# Patient Record
Sex: FEMALE | Race: WHITE | URBAN - NONMETROPOLITAN AREA
[De-identification: names, ages, dates, MRNs, and addresses within clinical notes are randomized per-mention and may not be internally consistent; named-entity substitution may affect disease eponyms.]

---

## 2008-04-25 LAB — TSH SERPL DL<=0.05 MIU/L-ACNC: NORMAL M[IU]/L

## 2019-07-16 ENCOUNTER — OFFICE VISIT (OUTPATIENT)
Dept: FAMILY MEDICINE CLINIC | Age: 49
End: 2019-07-16
Payer: COMMERCIAL

## 2019-07-16 ENCOUNTER — HOSPITAL ENCOUNTER (OUTPATIENT)
Age: 49
Discharge: HOME | End: 2019-07-18
Payer: COMMERCIAL

## 2019-07-16 VITALS
BODY MASS INDEX: 24.59 KG/M2 | WEIGHT: 144 LBS | DIASTOLIC BLOOD PRESSURE: 72 MMHG | HEART RATE: 74 BPM | SYSTOLIC BLOOD PRESSURE: 122 MMHG | TEMPERATURE: 98.7 F | HEIGHT: 64 IN | OXYGEN SATURATION: 98 %

## 2019-07-16 DIAGNOSIS — B96.89 ACUTE BACTERIAL SINUSITIS: ICD-10-CM

## 2019-07-16 DIAGNOSIS — J03.90 EXUDATIVE TONSILLITIS: ICD-10-CM

## 2019-07-16 DIAGNOSIS — J40 BRONCHITIS: ICD-10-CM

## 2019-07-16 DIAGNOSIS — J01.90 ACUTE BACTERIAL SINUSITIS: ICD-10-CM

## 2019-07-16 DIAGNOSIS — J03.90 EXUDATIVE TONSILLITIS: Primary | ICD-10-CM

## 2019-07-16 PROCEDURE — 87081 CULTURE SCREEN ONLY: CPT

## 2019-07-16 PROCEDURE — 99213 OFFICE O/P EST LOW 20 MIN: CPT | Performed by: FAMILY MEDICINE

## 2019-07-16 RX ORDER — MONTELUKAST SODIUM 10 MG/1
TABLET ORAL
Refills: 1 | COMMUNITY
Start: 2019-06-06 | End: 2019-08-28 | Stop reason: SDUPTHER

## 2019-07-16 RX ORDER — FLUTICASONE PROPIONATE 50 MCG
SPRAY, SUSPENSION (ML) NASAL
Refills: 4 | COMMUNITY
Start: 2019-05-27 | End: 2022-10-05 | Stop reason: ALTCHOICE

## 2019-07-16 RX ORDER — AMOXICILLIN 875 MG/1
875 TABLET, COATED ORAL 2 TIMES DAILY
Qty: 20 TABLET | Refills: 0 | Status: SHIPPED | OUTPATIENT
Start: 2019-07-16 | End: 2019-07-26

## 2019-07-16 RX ORDER — METHYLPREDNISOLONE 4 MG/1
TABLET ORAL
Qty: 21 TABLET | Refills: 0 | Status: SHIPPED | OUTPATIENT
Start: 2019-07-16 | End: 2019-07-22

## 2019-07-16 NOTE — PATIENT INSTRUCTIONS
and go to all appointments, and call your doctor if you are having problems. It's also a good idea to know your test results and keep a list of the medicines you take. How can you care for yourself at home? · If your doctor prescribed antibiotics, take them as directed. Do not stop taking them just because you feel better. You need to take the full course of antibiotics. · Gargle with warm salt water. This helps reduce swelling and relieve discomfort. Gargle once an hour with 1 teaspoon of salt mixed in 8 fluid ounces of warm water. · Take an over-the-counter pain medicine, such as acetaminophen (Tylenol), ibuprofen (Advil, Motrin), or naproxen (Aleve). Be safe with medicines. Read and follow all instructions on the label. No one younger than 20 should take aspirin. It has been linked to Reye syndrome, a serious illness. · Be careful when taking over-the-counter cold or flu medicines and Tylenol at the same time. Many of these medicines have acetaminophen, which is Tylenol. Read the labels to make sure that you are not taking more than the recommended dose. Too much acetaminophen (Tylenol) can be harmful. · Try an over-the-counter throat spray to relieve throat pain. · Drink plenty of fluids. Fluids may help soothe an irritated throat. Drink warm or cool liquids (whichever feels better). These include tea, soup, and juice. · Do not smoke, and avoid secondhand smoke. Smoking can make tonsillitis worse. If you need help quitting, talk to your doctor about stop-smoking programs and medicines. These can increase your chances of quitting for good. · Use a vaporizer or humidifier to add moisture to your bedroom. Follow the directions for cleaning the machine. When should you call for help?   Call your doctor now or seek immediate medical care if:    · Your pain gets worse on one side of your throat.     · You have a new or higher fever.     · You notice changes in your voice.     · You have trouble opening your

## 2019-07-16 NOTE — PROGRESS NOTES
Subjective:  Chief Complaint   Patient presents with    Pharyngitis       HPI: The patient states that they have had sinus pressure and congestion for the last 3 weeks. The patient does admit to facial pressure. Admits to cough which is productive of sputum. The patient also reports nasal congestion and sore throat. Denies pain with swallowing/difficulty swallowing. Patient has had yellow rhinorrhea. Mild headache. Denies fevers chills. No dizziness, visual disturbances and or neck stiffness. No chest pain or dyspnea. No vomiting or diarrhea. The patient presents for evaluation. Also admits to hoarseness. H/o allergic rhinitis, takes both singulair and uses fluticasone. ROS:  Const: Positives and pertinent negatives as per HPI. All others reviewed and are negative. Current Outpatient Medications:     amoxicillin (AMOXIL) 875 MG tablet, Take 1 tablet by mouth 2 times daily for 10 days, Disp: 20 tablet, Rfl: 0    methylPREDNISolone (MEDROL DOSEPACK) 4 MG tablet, Take by mouth., Disp: 21 tablet, Rfl: 0    fluticasone (FLONASE) 50 MCG/ACT nasal spray, INSTILL TWO SPRAYS INTO EACH NOSTRIL DAILY, Disp: , Rfl: 4    montelukast (SINGULAIR) 10 MG tablet, TAKE ONE TABLET BY MOUTH EVERY DAY, Disp: , Rfl: 1  No Known Allergies    Objective:  Vitals:    07/16/19 1032   BP: 122/72   Pulse: 74   Temp: 98.7 °F (37.1 °C)   SpO2: 98%   Weight: 144 lb (65.3 kg)   Height: 5' 4\" (1.626 m)       Exam:  Exam:  Const: Appears healthy and well developed. No signs of acute distress present. Vitals reviewed per triage. Head/Face: Normocephalic, atraumatic. Facies is symmetric. Tenderness is noted over the frontal maxillary sinuses. Eyes: PERRL. ENMT: Tympanic membranes are pearly gray with good light reflex bilaterally. Buccal mucosa is moist.  erythema in the posterior pharynx. Purulent PND is noted. Exudative tonsillitis  Neck: Supple and symmetric. Palpation reveals adenopathy. No meningeal signs. Trachea

## 2019-07-19 LAB — S PYO THROAT QL CULT: NORMAL

## 2019-07-30 ENCOUNTER — HOSPITAL ENCOUNTER (OUTPATIENT)
Age: 49
Discharge: HOME | End: 2019-08-01
Payer: COMMERCIAL

## 2019-07-30 ENCOUNTER — OFFICE VISIT (OUTPATIENT)
Dept: FAMILY MEDICINE CLINIC | Age: 49
End: 2019-07-30
Payer: COMMERCIAL

## 2019-07-30 VITALS
OXYGEN SATURATION: 97 % | BODY MASS INDEX: 25.4 KG/M2 | SYSTOLIC BLOOD PRESSURE: 116 MMHG | TEMPERATURE: 97.7 F | HEART RATE: 100 BPM | WEIGHT: 148 LBS | DIASTOLIC BLOOD PRESSURE: 62 MMHG

## 2019-07-30 DIAGNOSIS — R30.0 DYSURIA: Primary | ICD-10-CM

## 2019-07-30 DIAGNOSIS — R10.9 RIGHT FLANK PAIN: ICD-10-CM

## 2019-07-30 LAB
BILIRUBIN, POC: NORMAL
BLOOD URINE, POC: NORMAL
CLARITY, POC: YELLOW
COLOR, POC: NORMAL
GLUCOSE URINE, POC: NORMAL
KETONES, POC: NORMAL
LEUKOCYTE EST, POC: NORMAL
NITRITE, POC: NORMAL
PH, POC: 6.5
PROTEIN, POC: NORMAL
SPECIFIC GRAVITY, POC: 1.01
UROBILINOGEN, POC: 0.2

## 2019-07-30 PROCEDURE — 81002 URINALYSIS NONAUTO W/O SCOPE: CPT | Performed by: FAMILY MEDICINE

## 2019-07-30 PROCEDURE — 87088 URINE BACTERIA CULTURE: CPT

## 2019-07-30 PROCEDURE — 99214 OFFICE O/P EST MOD 30 MIN: CPT | Performed by: FAMILY MEDICINE

## 2019-07-30 RX ORDER — SULFAMETHOXAZOLE AND TRIMETHOPRIM 800; 160 MG/1; MG/1
1 TABLET ORAL 2 TIMES DAILY
Qty: 14 TABLET | Refills: 0 | Status: SHIPPED | OUTPATIENT
Start: 2019-07-30 | End: 2019-08-05

## 2019-08-01 LAB — URINE CULTURE, ROUTINE: NORMAL

## 2019-08-02 ENCOUNTER — TELEPHONE (OUTPATIENT)
Dept: FAMILY MEDICINE CLINIC | Age: 49
End: 2019-08-02

## 2019-08-05 ENCOUNTER — OFFICE VISIT (OUTPATIENT)
Dept: FAMILY MEDICINE CLINIC | Age: 49
End: 2019-08-05
Payer: COMMERCIAL

## 2019-08-05 VITALS
WEIGHT: 145.38 LBS | TEMPERATURE: 98.7 F | BODY MASS INDEX: 26.75 KG/M2 | OXYGEN SATURATION: 99 % | HEIGHT: 62 IN | DIASTOLIC BLOOD PRESSURE: 60 MMHG | HEART RATE: 88 BPM | SYSTOLIC BLOOD PRESSURE: 104 MMHG

## 2019-08-05 DIAGNOSIS — B02.9 HERPES ZOSTER WITHOUT COMPLICATION: Primary | ICD-10-CM

## 2019-08-05 PROCEDURE — 99213 OFFICE O/P EST LOW 20 MIN: CPT | Performed by: FAMILY MEDICINE

## 2019-08-05 RX ORDER — ACYCLOVIR 800 MG/1
800 TABLET ORAL 3 TIMES DAILY
Qty: 30 TABLET | Refills: 0 | Status: SHIPPED | OUTPATIENT
Start: 2019-08-05 | End: 2019-08-15

## 2019-08-05 NOTE — PROGRESS NOTES
8/5/19    Name: Garth Rehman:[id number]   Sex:female   Age:48 y.o. Subjective:  Chief Complaint   Patient presents with    Sinusitis     worried it is not resolved.  Rash     rash above left eyebrow. Patinet presents rash above left eye  Has beenunder a lot of stress lately    Sick for the last few weeks    Under  A lot of stress at work        ROS:  As above, all other pertinent systems negative. Current Outpatient Medications:     acyclovir (ZOVIRAX) 800 MG tablet, Take 1 tablet by mouth 3 times daily for 10 days, Disp: 30 tablet, Rfl: 0    fluticasone (FLONASE) 50 MCG/ACT nasal spray, INSTILL TWO SPRAYS INTO EACH NOSTRIL DAILY, Disp: , Rfl: 4    montelukast (SINGULAIR) 10 MG tablet, TAKE ONE TABLET BY MOUTH EVERY DAY, Disp: , Rfl: 1  No Known Allergies     /60   Pulse 88   Temp 98.7 °F (37.1 °C)   Ht 5' 2\" (1.575 m)   Wt 145 lb 6 oz (65.9 kg)   LMP 07/28/2019   SpO2 99%   BMI 26.59 kg/m²     EXAM:   Physical Exam   Constitutional: She appears well-developed and well-nourished. HENT:   Head: Normocephalic and atraumatic. Eyes: Pupils are equal, round, and reactive to light. EOM are normal.   Neck: Normal range of motion. Cardiovascular: Normal rate and regular rhythm. Pulmonary/Chest: Effort normal and breath sounds normal.   Skin: Skin is warm and dry. Blistery rash above left eyebrow   Nursing note and vitals reviewed. Nuha Prescott was seen today for sinusitis and rash. Diagnoses and all orders for this visit:    Herpes zoster without complication  -     acyclovir (ZOVIRAX) 800 MG tablet; Take 1 tablet by mouth 3 times daily for 10 days    f/u as needed      Seen by:   Kyung James DO

## 2019-08-28 ENCOUNTER — OFFICE VISIT (OUTPATIENT)
Dept: FAMILY MEDICINE CLINIC | Age: 49
End: 2019-08-28
Payer: COMMERCIAL

## 2019-08-28 ENCOUNTER — TELEPHONE (OUTPATIENT)
Dept: FAMILY MEDICINE CLINIC | Age: 49
End: 2019-08-28

## 2019-08-28 ENCOUNTER — HOSPITAL ENCOUNTER (OUTPATIENT)
Age: 49
Discharge: HOME | End: 2019-08-30
Payer: COMMERCIAL

## 2019-08-28 VITALS
DIASTOLIC BLOOD PRESSURE: 82 MMHG | OXYGEN SATURATION: 98 % | HEIGHT: 62 IN | HEART RATE: 80 BPM | SYSTOLIC BLOOD PRESSURE: 118 MMHG | BODY MASS INDEX: 26.31 KG/M2 | WEIGHT: 143 LBS

## 2019-08-28 DIAGNOSIS — Z00.00 ROUTINE GENERAL MEDICAL EXAMINATION AT A HEALTH CARE FACILITY: ICD-10-CM

## 2019-08-28 DIAGNOSIS — N20.0 NEPHROLITHIASIS: ICD-10-CM

## 2019-08-28 DIAGNOSIS — J30.2 SEASONAL ALLERGIES: ICD-10-CM

## 2019-08-28 DIAGNOSIS — Z92.29 HISTORY OF REGULAR MEDICATION USE: ICD-10-CM

## 2019-08-28 DIAGNOSIS — Z00.00 ROUTINE GENERAL MEDICAL EXAMINATION AT A HEALTH CARE FACILITY: Primary | ICD-10-CM

## 2019-08-28 LAB
ALBUMIN SERPL-MCNC: 4.6 G/DL (ref 3.5–5.2)
ALP BLD-CCNC: 56 U/L (ref 35–104)
ALT SERPL-CCNC: 16 U/L (ref 0–32)
ANION GAP SERPL CALCULATED.3IONS-SCNC: 12 MMOL/L (ref 7–16)
AST SERPL-CCNC: 15 U/L (ref 0–31)
BASOPHILS ABSOLUTE: 0.03 E9/L (ref 0–0.2)
BASOPHILS RELATIVE PERCENT: 0.5 % (ref 0–2)
BILIRUB SERPL-MCNC: 0.6 MG/DL (ref 0–1.2)
BUN BLDV-MCNC: 14 MG/DL (ref 6–20)
CALCIUM SERPL-MCNC: 9.2 MG/DL (ref 8.6–10.2)
CHLORIDE BLD-SCNC: 102 MMOL/L (ref 98–107)
CHOLESTEROL, TOTAL: 200 MG/DL (ref 0–199)
CO2: 24 MMOL/L (ref 22–29)
CREAT SERPL-MCNC: 0.6 MG/DL (ref 0.5–1)
EOSINOPHILS ABSOLUTE: 0.17 E9/L (ref 0.05–0.5)
EOSINOPHILS RELATIVE PERCENT: 2.9 % (ref 0–6)
GFR AFRICAN AMERICAN: >60
GFR NON-AFRICAN AMERICAN: >60 ML/MIN/1.73
GLUCOSE BLD-MCNC: 103 MG/DL (ref 74–99)
HCT VFR BLD CALC: 42.7 % (ref 34–48)
HDLC SERPL-MCNC: 62 MG/DL
HEMOGLOBIN: 13.9 G/DL (ref 11.5–15.5)
IMMATURE GRANULOCYTES #: 0.01 E9/L
IMMATURE GRANULOCYTES %: 0.2 % (ref 0–5)
LDL CHOLESTEROL CALCULATED: 113 MG/DL (ref 0–99)
LYMPHOCYTES ABSOLUTE: 1.72 E9/L (ref 1.5–4)
LYMPHOCYTES RELATIVE PERCENT: 29.7 % (ref 20–42)
MCH RBC QN AUTO: 31.4 PG (ref 26–35)
MCHC RBC AUTO-ENTMCNC: 32.6 % (ref 32–34.5)
MCV RBC AUTO: 96.4 FL (ref 80–99.9)
MONOCYTES ABSOLUTE: 0.58 E9/L (ref 0.1–0.95)
MONOCYTES RELATIVE PERCENT: 10 % (ref 2–12)
NEUTROPHILS ABSOLUTE: 3.29 E9/L (ref 1.8–7.3)
NEUTROPHILS RELATIVE PERCENT: 56.7 % (ref 43–80)
PDW BLD-RTO: 12.2 FL (ref 11.5–15)
PLATELET # BLD: 370 E9/L (ref 130–450)
PMV BLD AUTO: 8.8 FL (ref 7–12)
POTASSIUM SERPL-SCNC: 4.2 MMOL/L (ref 3.5–5)
RBC # BLD: 4.43 E12/L (ref 3.5–5.5)
SODIUM BLD-SCNC: 138 MMOL/L (ref 132–146)
TOTAL PROTEIN: 7.3 G/DL (ref 6.4–8.3)
TRIGL SERPL-MCNC: 123 MG/DL (ref 0–149)
VLDLC SERPL CALC-MCNC: 25 MG/DL
WBC # BLD: 5.8 E9/L (ref 4.5–11.5)

## 2019-08-28 PROCEDURE — 85025 COMPLETE CBC W/AUTO DIFF WBC: CPT

## 2019-08-28 PROCEDURE — 80061 LIPID PANEL: CPT

## 2019-08-28 PROCEDURE — 99396 PREV VISIT EST AGE 40-64: CPT | Performed by: INTERNAL MEDICINE

## 2019-08-28 PROCEDURE — 80053 COMPREHEN METABOLIC PANEL: CPT

## 2019-08-28 PROCEDURE — 36415 COLL VENOUS BLD VENIPUNCTURE: CPT

## 2019-08-28 RX ORDER — MONTELUKAST SODIUM 10 MG/1
TABLET ORAL
Qty: 30 TABLET | Refills: 11 | Status: SHIPPED
Start: 2019-08-28 | End: 2020-08-31 | Stop reason: SDUPTHER

## 2019-08-28 ASSESSMENT — PATIENT HEALTH QUESTIONNAIRE - PHQ9
SUM OF ALL RESPONSES TO PHQ QUESTIONS 1-9: 0
1. LITTLE INTEREST OR PLEASURE IN DOING THINGS: 0
2. FEELING DOWN, DEPRESSED OR HOPELESS: 0
SUM OF ALL RESPONSES TO PHQ QUESTIONS 1-9: 0
SUM OF ALL RESPONSES TO PHQ9 QUESTIONS 1 & 2: 0

## 2019-08-28 NOTE — PROGRESS NOTES
Not on file     Non-medical: Not on file   Tobacco Use    Smoking status: Never Smoker    Smokeless tobacco: Never Used   Substance and Sexual Activity    Alcohol use: Not on file    Drug use: Not on file    Sexual activity: Not on file   Lifestyle    Physical activity:     Days per week: Not on file     Minutes per session: Not on file    Stress: Not on file   Relationships    Social connections:     Talks on phone: Not on file     Gets together: Not on file     Attends [employer] service: Not on file     Active member of club or organization: Not on file     Attends meetings of clubs or organizations: Not on file     Relationship status: Not on file    Intimate partner violence:     Fear of current or ex partner: Not on file     Emotionally abused: Not on file     Physically abused: Not on file     Forced sexual activity: Not on file   Other Topics Concern    Not on file   Social History Narrative    Not on file       Vitals:    08/28/19 0657   BP: 118/82   Pulse: 80   SpO2: 98%   Weight: 143 lb (64.9 kg)   Height: 5' 2\" (1.575 m)       Physical Exam  Const: Appears well developed and well nourished. No signs of acute distress present. Eyes: EOMI in both eyes. PERRL. ENMT: . (External Ears) Tympanic membranes are intact. External nose WNL. Moistness and  normal color of the nasal mucosae. Nasal septum. (Septum) . (Teeth) Gums appear healthy. Posterior pharynx shows postnasal drip, but no exudate, irritation or redness. Neck: Supple and symmetric. Palpation reveals no adenopathy. No masses appreciated. Thyroid  exhibits no nodule or thyromegaly. No JVD. Carotids: 2+ and equal bilaterally, without bruits. Resp: No rales, rhonchi or wheezes appreciated over the lungs bilaterally. CV: Rate is regular. Rhythm is regular. S1 is normal. S2 is normal. No gallop or rubs. No heart  murmur appreciated. Extremities: No clubbing, cyanosis or edema. Abdomen: Bowel sounds are normoactive.  Palpation reveals

## 2020-03-09 ENCOUNTER — OFFICE VISIT (OUTPATIENT)
Dept: FAMILY MEDICINE CLINIC | Age: 50
End: 2020-03-09
Payer: COMMERCIAL

## 2020-03-09 ENCOUNTER — HOSPITAL ENCOUNTER (OUTPATIENT)
Age: 50
Discharge: HOME | End: 2020-03-11
Payer: COMMERCIAL

## 2020-03-09 VITALS
BODY MASS INDEX: 26.13 KG/M2 | TEMPERATURE: 98.7 F | OXYGEN SATURATION: 98 % | WEIGHT: 142 LBS | HEIGHT: 62 IN | SYSTOLIC BLOOD PRESSURE: 128 MMHG | DIASTOLIC BLOOD PRESSURE: 62 MMHG | HEART RATE: 107 BPM

## 2020-03-09 LAB
INFLUENZA A ANTIBODY: NEGATIVE
INFLUENZA B ANTIBODY: NEGATIVE
S PYO AG THROAT QL: NORMAL

## 2020-03-09 PROCEDURE — 87804 INFLUENZA ASSAY W/OPTIC: CPT | Performed by: PHYSICIAN ASSISTANT

## 2020-03-09 PROCEDURE — 87070 CULTURE OTHR SPECIMN AEROBIC: CPT

## 2020-03-09 PROCEDURE — 87880 STREP A ASSAY W/OPTIC: CPT | Performed by: PHYSICIAN ASSISTANT

## 2020-03-09 PROCEDURE — 99213 OFFICE O/P EST LOW 20 MIN: CPT | Performed by: PHYSICIAN ASSISTANT

## 2020-03-09 RX ORDER — OSELTAMIVIR PHOSPHATE 75 MG/1
75 CAPSULE ORAL 2 TIMES DAILY
Qty: 10 CAPSULE | Refills: 0 | Status: SHIPPED | OUTPATIENT
Start: 2020-03-09 | End: 2020-03-14

## 2020-03-09 RX ORDER — BROMPHENIRAMINE MALEATE, PSEUDOEPHEDRINE HYDROCHLORIDE, AND DEXTROMETHORPHAN HYDROBROMIDE 2; 30; 10 MG/5ML; MG/5ML; MG/5ML
5 SYRUP ORAL 4 TIMES DAILY PRN
Qty: 120 ML | Refills: 0 | Status: SHIPPED | OUTPATIENT
Start: 2020-03-09 | End: 2020-03-16

## 2020-03-09 NOTE — PROGRESS NOTES
3/9/2020   [address] 35  [facility]  : 1970  Age: 54 y.o. Sex: female      Subjective:  Chief Complaint   Patient presents with    Cough    Fever    Generalized Body Aches    Pharyngitis       HPI: The patient states cough sore throat, subjective fever, chills, myalgias and nasal and chest congestion that started abruptly yesterday. Cough is productive of discolored sputum. Pt has been taking OTC medications without significant improvement. Denies chest pain or shortness of breath. No vomiting or diarrhea. Eating and drinking without difficulty. Patient states she recently went to [facility] last week, returning a week ago yesterday. She denies smoking and any medical problems. The patient denies any other symptoms and presents for evaluation. ROS:    Constitutional: Positive fever negative for fatigue and unexpected weight change. HENT: + for sore throat, sinus pressure, drainage, congestion. Negative for  ear discharge, ear pain, hearing loss, mouth sores and sneezing. Eyes: Negative for photophobia, pain, redness and itching. Respiratory: +cough,  Negative for chest tightness, shortness of breath and wheezing. Cardiovascular: Negative for chest pain, palpitations and leg swelling. Gastrointestinal:  Negative for abdominal pain, constipation, diarrhea, nausea and vomiting. Endocrine: Negative for cold intolerance and heat intolerance. Genitourinary: Negative for difficulty urinating, dysuria, frequency, hematuria and urgency. Musculoskeletal: Positive myalgias. Negative for arthralgias, back pain, joint swelling, neck pain and neck stiffness. Skin: Negative for color change, pallor and wound. Allergic/Immunologic: Negative for environmental allergies and food allergies. Neurological: Negative for dizziness, seizures, syncope, weakness, light-headedness and headaches.    Hematological: Negative for adenopathy. Current Outpatient Medications:     brompheniramine-pseudoephedrine-DM 2-30-10 MG/5ML syrup, Take 5 mLs by mouth 4 times daily as needed for Congestion or Cough, Disp: 120 mL, Rfl: 0    oseltamivir (TAMIFLU) 75 MG capsule, Take 1 capsule by mouth 2 times daily for 5 days, Disp: 10 capsule, Rfl: 0    montelukast (SINGULAIR) 10 MG tablet, TAKE ONE TABLET BY MOUTH EVERY DAY, Disp: 30 tablet, Rfl: 11    fluticasone (FLONASE) 50 MCG/ACT nasal spray, INSTILL TWO SPRAYS INTO EACH NOSTRIL DAILY, Disp: , Rfl: 4   No Known Allergies     Objective:  Vitals:    03/09/20 0826   BP: 128/62   Pulse: 107   Temp: 98.7 °F (37.1 °C)   SpO2: 98%   Weight: 142 lb (64.4 kg)   Height: 5' 2\" (1.575 m)        Exam:  Const: Appears healthy and well developed. No signs of acute distress present. Vitals reviewed per triage. Head/Face: Normocephalic, atraumatic. Facies is symmetric. Eyes: PERRL. ENMT: Tympanic membranes are pearly gray with good light reflex bilaterally. Nares are patent. Buccal mucosa is moist.  No erythema in the posterior pharynx. Neck: Supple and symmetric. Palpation reveals no adenopathy. Trachea midline. Resp: Lungs are clear bilaterally. No adventitious sounds audible. CV: S1 is normal. S2 is normal.Pulses are equal bilaterally. Musculo: Patient moves extremities without pain or limitation. No pedal edema. Skin: Skin is warm and dry. Neuro: Alert and oriented x3. Speech is articulate and fluent. Psych: Patient's mood and affect is appropriate to situation. Jesus was seen today for cough, fever, generalized body aches and pharyngitis. Diagnoses and all orders for this visit:    Cough  -     POCT Influenza A/B    Sore throat  -     POCT rapid strep A  -     THROAT CULTURE; Future    Flu-like symptoms  -     brompheniramine-pseudoephedrine-DM 2-30-10 MG/5ML syrup;  Take 5 mLs by mouth 4 times daily as needed for Congestion or Cough  -     oseltamivir (TAMIFLU) 75 MG

## 2020-03-11 LAB — THROAT CULTURE: NORMAL

## 2020-06-17 ENCOUNTER — TELEPHONE (OUTPATIENT)
Dept: FAMILY MEDICINE CLINIC | Age: 50
End: 2020-06-17

## 2020-06-17 ENCOUNTER — VIRTUAL VISIT (OUTPATIENT)
Dept: FAMILY MEDICINE CLINIC | Age: 50
End: 2020-06-17
Payer: COMMERCIAL

## 2020-06-17 PROCEDURE — 99213 OFFICE O/P EST LOW 20 MIN: CPT | Performed by: INTERNAL MEDICINE

## 2020-06-17 RX ORDER — AMOXICILLIN AND CLAVULANATE POTASSIUM 875; 125 MG/1; MG/1
1 TABLET, FILM COATED ORAL 2 TIMES DAILY
Qty: 20 TABLET | Refills: 0 | Status: SHIPPED | OUTPATIENT
Start: 2020-06-17 | End: 2020-06-27

## 2020-06-17 RX ORDER — PREDNISONE 10 MG/1
TABLET ORAL
Qty: 12 TABLET | Refills: 0 | Status: SHIPPED | OUTPATIENT
Start: 2020-06-17 | End: 2020-06-23

## 2020-06-17 ASSESSMENT — PATIENT HEALTH QUESTIONNAIRE - PHQ9
SUM OF ALL RESPONSES TO PHQ QUESTIONS 1-9: 0
2. FEELING DOWN, DEPRESSED OR HOPELESS: 0
SUM OF ALL RESPONSES TO PHQ9 QUESTIONS 1 & 2: 0
SUM OF ALL RESPONSES TO PHQ QUESTIONS 1-9: 0
1. LITTLE INTEREST OR PLEASURE IN DOING THINGS: 0

## 2020-06-17 ASSESSMENT — ENCOUNTER SYMPTOMS
SORE THROAT: 0
CHEST TIGHTNESS: 0
EYES NEGATIVE: 1
NAUSEA: 1
SINUS PRESSURE: 1
SINUS PAIN: 1
WHEEZING: 0
COUGH: 0
SHORTNESS OF BREATH: 0

## 2020-08-31 ENCOUNTER — TELEPHONE (OUTPATIENT)
Dept: FAMILY MEDICINE CLINIC | Age: 50
End: 2020-08-31

## 2020-08-31 ENCOUNTER — HOSPITAL ENCOUNTER (OUTPATIENT)
Age: 50
Discharge: HOME | End: 2020-09-02
Payer: COMMERCIAL

## 2020-08-31 ENCOUNTER — OFFICE VISIT (OUTPATIENT)
Dept: FAMILY MEDICINE CLINIC | Age: 50
End: 2020-08-31
Payer: COMMERCIAL

## 2020-08-31 VITALS
HEART RATE: 74 BPM | DIASTOLIC BLOOD PRESSURE: 68 MMHG | TEMPERATURE: 97.6 F | OXYGEN SATURATION: 98 % | HEIGHT: 62 IN | SYSTOLIC BLOOD PRESSURE: 116 MMHG | WEIGHT: 144.6 LBS | BODY MASS INDEX: 26.61 KG/M2

## 2020-08-31 LAB
ALBUMIN SERPL-MCNC: 4.2 G/DL (ref 3.5–5.2)
ALP BLD-CCNC: 57 U/L (ref 35–104)
ALT SERPL-CCNC: 15 U/L (ref 0–32)
ANION GAP SERPL CALCULATED.3IONS-SCNC: 13 MMOL/L (ref 7–16)
AST SERPL-CCNC: 19 U/L (ref 0–31)
BASOPHILS ABSOLUTE: 0.03 E9/L (ref 0–0.2)
BASOPHILS RELATIVE PERCENT: 0.5 % (ref 0–2)
BILIRUB SERPL-MCNC: 0.8 MG/DL (ref 0–1.2)
BUN BLDV-MCNC: 13 MG/DL (ref 6–20)
CALCIUM SERPL-MCNC: 9.2 MG/DL (ref 8.6–10.2)
CHLORIDE BLD-SCNC: 100 MMOL/L (ref 98–107)
CO2: 24 MMOL/L (ref 22–29)
CREAT SERPL-MCNC: 0.6 MG/DL (ref 0.5–1)
EOSINOPHILS ABSOLUTE: 0.2 E9/L (ref 0.05–0.5)
EOSINOPHILS RELATIVE PERCENT: 3.4 % (ref 0–6)
GFR AFRICAN AMERICAN: >60
GFR NON-AFRICAN AMERICAN: >60 ML/MIN/1.73
GLUCOSE BLD-MCNC: 86 MG/DL (ref 74–99)
HCT VFR BLD CALC: 41.2 % (ref 34–48)
HEMOGLOBIN: 13.4 G/DL (ref 11.5–15.5)
IMMATURE GRANULOCYTES #: 0.01 E9/L
IMMATURE GRANULOCYTES %: 0.2 % (ref 0–5)
LYMPHOCYTES ABSOLUTE: 1.82 E9/L (ref 1.5–4)
LYMPHOCYTES RELATIVE PERCENT: 30.7 % (ref 20–42)
MCH RBC QN AUTO: 31.6 PG (ref 26–35)
MCHC RBC AUTO-ENTMCNC: 32.5 % (ref 32–34.5)
MCV RBC AUTO: 97.2 FL (ref 80–99.9)
MONOCYTES ABSOLUTE: 0.63 E9/L (ref 0.1–0.95)
MONOCYTES RELATIVE PERCENT: 10.6 % (ref 2–12)
NEUTROPHILS ABSOLUTE: 3.24 E9/L (ref 1.8–7.3)
NEUTROPHILS RELATIVE PERCENT: 54.6 % (ref 43–80)
PDW BLD-RTO: 12.3 FL (ref 11.5–15)
PLATELET # BLD: 355 E9/L (ref 130–450)
PMV BLD AUTO: 9.2 FL (ref 7–12)
POTASSIUM SERPL-SCNC: 4.1 MMOL/L (ref 3.5–5)
RBC # BLD: 4.24 E12/L (ref 3.5–5.5)
SODIUM BLD-SCNC: 137 MMOL/L (ref 132–146)
TOTAL PROTEIN: 6.8 G/DL (ref 6.4–8.3)
WBC # BLD: 5.9 E9/L (ref 4.5–11.5)

## 2020-08-31 PROCEDURE — 99396 PREV VISIT EST AGE 40-64: CPT | Performed by: INTERNAL MEDICINE

## 2020-08-31 PROCEDURE — 85025 COMPLETE CBC W/AUTO DIFF WBC: CPT

## 2020-08-31 PROCEDURE — 36415 COLL VENOUS BLD VENIPUNCTURE: CPT

## 2020-08-31 PROCEDURE — 80053 COMPREHEN METABOLIC PANEL: CPT

## 2020-08-31 RX ORDER — MONTELUKAST SODIUM 10 MG/1
TABLET ORAL
Qty: 30 TABLET | Refills: 11 | Status: SHIPPED
Start: 2020-08-31 | End: 2021-09-01 | Stop reason: SDUPTHER

## 2020-08-31 RX ORDER — FEXOFENADINE HCL 180 MG/1
180 TABLET ORAL DAILY
COMMUNITY
End: 2021-09-01 | Stop reason: ALTCHOICE

## 2020-08-31 RX ORDER — FAMOTIDINE 20 MG/1
TABLET, FILM COATED ORAL
COMMUNITY
Start: 2020-08-25

## 2020-08-31 RX ORDER — AZELASTINE HCL 205.5 UG/1
SPRAY NASAL
COMMUNITY
Start: 2020-08-25 | End: 2021-09-01

## 2020-08-31 NOTE — PROGRESS NOTES
[facility]     [address]  Christian Burroughs : 1970 Sex: female  Age: 54 y.o. Chief Complaint   Patient presents with    Annual Exam       HPI  Patient presents today for annual physical/preventative visit. In general she is been doing well. She has seen allergy and immunology and there was discussion regarding immunotherapy and getting allergy shots which she is contemplating. They did start her on azelastine in addition to her Flonase and also added Pepcid  There was discussion about seeing ear nose and throat but she is not set up with them at this time and wants to wait and see how she does  With shots first.    Review of Systems   Const: Denies chills, fever and sweats. Eyes: Denies eye symptoms. ENMT: Denies ear symptoms. Reports postnasal drip, but denies other nasal symptoms. Denies  mouth or throat symptoms. Admits to frequent sinus infections and allergy type symptoms. CV: Denies chest pain, orthopnea and palpitations. Resp: Denies cough, SOB and wheezing. GI: Denies diarrhea, nausea and vomiting. : Urinary-see above  Musculo: Occasional arthralgias  Skin: Denies eczema, pruritus and sores. Neuro: Denies dizziness, headache, seizures and syncope. Positive for occasional tingling to feet. -Declined any further evaluation at this time  Psych: Denies psychiatric symptoms. Significant allergies         REST OF PERTINENT ROS GONE OVER AND WAS NEGATIVE.          PMH:  Problem List: Spasm, Low back pain, Headache, Neck pain  Health Maintenance:  Influenza Vaccination - (10/5/2018)  Colonoscopy - () , -due 22  Pelvic/Pap Exam - (3/19/0) GYN-  EKG -   Medical Problems:  Herpes Zoster, Gestational Diabetes. , Diverticulitis, IBS  Herpes Simplex - Recurrent left forehead  Seasonal Allergies  Surgical Hx:  Tubal Ligation - (2003)  Laparoscopic colon resection for diverticulitis  Reviewed, no changes.   FH:  She has a half sister with some kind of benign pancreatic tumor . ovarian cyst  Also, diabetes runs on the father's side. Nothing else in the family history she is aware of. .  Father:  Renal cell cancer. Mother:  Insulin Dependent Diabetes, Hypertension, Arrhythmia. Sergey Angulo  1970 Page #2  Sister 1:  No Current Problems - twin sister. Maternal Grandmother:  Bladder Cancer.,  Breast cancer  Maternal Grandfather:  Heart Disease - . Paternal Aunt 1:  Crohn's Disease. Reviewed, no changes. SH:  Marital: . perishable Director, [employer] [place]  Personal Habits: Cigarette Use: Negative For current cigarette smoker. Alcohol: Occasionally  consumes alcohol. Reviewed, no changes.             Current Outpatient Medications:     azelastine HCl 0.15 % SOLN, USE ONE SPRAY INTO NOSTRIL(S) TWICE A DAY OR TWO SPRAYS ONCE A DAY., Disp: , Rfl:     famotidine (PEPCID) 20 MG tablet, , Disp: , Rfl:     montelukast (SINGULAIR) 10 MG tablet, TAKE ONE TABLET BY MOUTH EVERY DAY, Disp: 30 tablet, Rfl: 11    Multiple Vitamin (MULTI-VITAMIN DAILY PO), Take by mouth, Disp: , Rfl:     Ascorbic Acid (VITAMIN C PO), Take by mouth, Disp: , Rfl:     VITAMIN D PO, Take by mouth, Disp: , Rfl:     fexofenadine (ALLEGRA ALLERGY) 180 MG tablet, Take 180 mg by mouth daily, Disp: , Rfl:     fluticasone (FLONASE) 50 MCG/ACT nasal spray, INSTILL TWO SPRAYS INTO EACH NOSTRIL DAILY, Disp: , Rfl: 4  No Known Allergies    No past medical history on file. No past surgical history on file. No family history on file.   Social History     Socioeconomic History    Marital status:      Spouse name: Not on file    Number of children: Not on file    Years of education: Not on file    Highest education level: Not on file   Occupational History    Not on file   Social Needs    Financial resource strain: Not on file    Food insecurity     Worry: Not on file     Inability: Not on file    Transportation needs     Medical: Not on file     Non-medical: Not on file reveals softness, with no distension,  organomegaly or tenderness. No abdominal masses palpable. No palpable hepatosplenomegaly. Musculo: Walks with a normal gait. Upper Extremities: Full ROM bilaterally. Const: Appears well developed and well nourished. No signs of acute distress present. Eyes: EOMI in both eyes. PERRL. ENMT: . (External Ears) Tympanic membranes are intact. External nose WNL. Moistness and  normal color of the nasal mucosae. Nasal septum. (Septum) . (Teeth) Gums appear healthy. Posterior pharynx shows postnasal drip, but no exudate, irritation or redness. Neck: Supple and symmetric. Palpation reveals no adenopathy. No masses appreciated. Thyroid  exhibits no nodule or thyromegaly. No JVD. Carotids: 2+ and equal bilaterally, without bruits. Resp: No rales, rhonchi or wheezes appreciated over the lungs bilaterally. CV: Rate is regular. Rhythm is regular. S1 is normal. S2 is normal. No gallop or rubs. No heart  murmur appreciated. Extremities: No clubbing, cyanosis or edema. Abdomen: Bowel sounds are normoactive. Palpation reveals softness, with no distension,  organomegaly or tenderness. No abdominal masses palpable. No palpable hepatosplenomegaly. Musculo: Walks with a normal gait. Upper Extremities: Full ROM bilaterally. She does have reproducible tenderness to palpation lateral epicondyle region right elbow. Lower Extremities:  Full ROM bilaterally. Skin: Dry and warm with no rash /darkened nevus below umbilicus, which she states she's had all of her  life and is unchanged. Neuro: Alert and oriented x3. Mood is normal. Affect is normal. Speech is articulate and fluent. Upper Extremities: motor strength is intact. Normal muscle tone bilaterally. Lower Extremities: motor  strength is intact. Normal muscle tone bilaterally. Sensation intact to light touch. Reflexes: DTR's are  symmetric, intact and 2+ bilaterally. Cranial Nerves: Cranial nerves II-XII intact.   Psych: Patient's attitude is cooperative. Patient's affect is appropriate. Judgement is realistic. Insight      Assessment and Plan:  Radha Putnam was seen today for annual exam.    Diagnoses and all orders for this visit:    Routine general medical examination at a health care facility  -     CBC Auto Differential; Future  -     Comprehensive Metabolic Panel; Future    Seasonal allergies    History of regular medication use  -     CBC Auto Differential; Future  -     Comprehensive Metabolic Panel; Future    Other orders  -     montelukast (SINGULAIR) 10 MG tablet; TAKE ONE TABLET BY MOUTH EVERY DAY    Plan: See above body report. Continue to follow with allergy and immunology. Continue to follow with GYN. Blood work today. See back in 1 year and as needed. Prescription management performed. Notify us with problems in the interim. Return in about 6 months (around 2/28/2021). Seen By:  Prisca Larsen MD      *Document was created using voice recognition software. Note was reviewed however may contain grammatical errors.

## 2020-12-30 ENCOUNTER — OFFICE VISIT (OUTPATIENT)
Dept: FAMILY MEDICINE CLINIC | Age: 50
End: 2020-12-30
Payer: COMMERCIAL

## 2020-12-30 VITALS
BODY MASS INDEX: 26.87 KG/M2 | HEART RATE: 104 BPM | WEIGHT: 146 LBS | DIASTOLIC BLOOD PRESSURE: 76 MMHG | SYSTOLIC BLOOD PRESSURE: 140 MMHG | TEMPERATURE: 98.6 F | HEIGHT: 62 IN | OXYGEN SATURATION: 98 %

## 2020-12-30 DIAGNOSIS — K57.92 DIVERTICULITIS: ICD-10-CM

## 2020-12-30 DIAGNOSIS — R11.0 NAUSEA: ICD-10-CM

## 2020-12-30 DIAGNOSIS — R10.9 ABDOMINAL PAIN, UNSPECIFIED ABDOMINAL LOCATION: ICD-10-CM

## 2020-12-30 DIAGNOSIS — R35.0 FREQUENCY OF MICTURITION: ICD-10-CM

## 2020-12-30 LAB
BILIRUBIN, POC: NEGATIVE
BLOOD URINE, POC: NEGATIVE
CLARITY, POC: CLEAR
COLOR, POC: NORMAL
GLUCOSE URINE, POC: NEGATIVE
KETONES, POC: NEGATIVE
LEUKOCYTE EST, POC: NEGATIVE
NITRITE, POC: NEGATIVE
PH, POC: 6
PROTEIN, POC: NEGATIVE
SPECIFIC GRAVITY, POC: 1.01
UROBILINOGEN, POC: 0.2

## 2020-12-30 PROCEDURE — 99214 OFFICE O/P EST MOD 30 MIN: CPT | Performed by: INTERNAL MEDICINE

## 2020-12-30 PROCEDURE — 81002 URINALYSIS NONAUTO W/O SCOPE: CPT | Performed by: INTERNAL MEDICINE

## 2020-12-30 RX ORDER — AMOXICILLIN AND CLAVULANATE POTASSIUM 875; 125 MG/1; MG/1
1 TABLET, FILM COATED ORAL 2 TIMES DAILY
Qty: 20 TABLET | Refills: 0 | Status: SHIPPED
Start: 2020-12-30 | End: 2020-12-30

## 2020-12-30 RX ORDER — AMOXICILLIN AND CLAVULANATE POTASSIUM 875; 125 MG/1; MG/1
1 TABLET, FILM COATED ORAL 2 TIMES DAILY
Qty: 20 TABLET | Refills: 0 | Status: SHIPPED | OUTPATIENT
Start: 2020-12-30 | End: 2021-01-09

## 2021-01-01 NOTE — PROGRESS NOTES
Neck pain  Health Maintenance:  Influenza Vaccination - (10/5/2018)  Colonoscopy - () , -due 22  Pelvic/Pap Exam - (3/19/0) GYN-  EKG -   Medical Problems:  Herpes Zoster, Gestational Diabetes. , Diverticulitis, IBS  Herpes Simplex - Recurrent left forehead  Seasonal Allergies  Kidney stones  Surgical Hx:  Tubal Ligation - (2003)  Laparoscopic colon resection for diverticulitis  Reviewed, no changes. FH:  She has a half sister with some kind of benign pancreatic tumor . ovarian cyst  Also, diabetes runs on the father's side. Nothing else in the family history she is aware of. .  Father:  Renal cell cancer. Mother:  Insulin Dependent Diabetes, Hypertension, Arrhythmia. Niranjan Mukherjee  1970 Page #2  Sister 1:  No Current Problems - twin sister. Maternal Grandmother:  Bladder Cancer. ,  Breast cancer  Maternal Grandfather:  Heart Disease - . Paternal Aunt 1:  Crohn's Disease. Reviewed, no changes. SH:  Marital: . perishable Director, sg joiner  Personal Habits: Cigarette Use: Negative For current cigarette smoker. Alcohol: Occasionally  consumes alcohol.   Reviewed, no changes.                Current Outpatient Medications:     amoxicillin-clavulanate (AUGMENTIN) 875-125 MG per tablet, Take 1 tablet by mouth 2 times daily for 10 days, Disp: 20 tablet, Rfl: 0    azelastine HCl 0.15 % SOLN, USE ONE SPRAY INTO NOSTRIL(S) TWICE A DAY OR TWO SPRAYS ONCE A DAY., Disp: , Rfl:     famotidine (PEPCID) 20 MG tablet, , Disp: , Rfl:     montelukast (SINGULAIR) 10 MG tablet, TAKE ONE TABLET BY MOUTH EVERY DAY, Disp: 30 tablet, Rfl: 11    Multiple Vitamin (MULTI-VITAMIN DAILY PO), Take by mouth, Disp: , Rfl:     Ascorbic Acid (VITAMIN C PO), Take by mouth, Disp: , Rfl:     VITAMIN D PO, Take by mouth, Disp: , Rfl:     fexofenadine (ALLEGRA ALLERGY) 180 MG tablet, Take 180 mg by mouth daily, Disp: , Rfl:     fluticasone (FLONASE) 50 MCG/ACT nasal spray, INSTILL TWO SPRAYS INTO EACH NOSTRIL DAILY, Disp: , Rfl: 4  No Known Allergies    No past medical history on file. No past surgical history on file. No family history on file. Social History     Socioeconomic History    Marital status:      Spouse name: Not on file    Number of children: Not on file    Years of education: Not on file    Highest education level: Not on file   Occupational History    Not on file   Social Needs    Financial resource strain: Not on file    Food insecurity     Worry: Not on file     Inability: Not on file    Transportation needs     Medical: Not on file     Non-medical: Not on file   Tobacco Use    Smoking status: Never Smoker    Smokeless tobacco: Never Used   Substance and Sexual Activity    Alcohol use: Not on file    Drug use: Not on file    Sexual activity: Not on file   Lifestyle    Physical activity     Days per week: Not on file     Minutes per session: Not on file    Stress: Not on file   Relationships    Social connections     Talks on phone: Not on file     Gets together: Not on file     Attends Gnosticism service: Not on file     Active member of club or organization: Not on file     Attends meetings of clubs or organizations: Not on file     Relationship status: Not on file    Intimate partner violence     Fear of current or ex partner: Not on file     Emotionally abused: Not on file     Physically abused: Not on file     Forced sexual activity: Not on file   Other Topics Concern    Not on file   Social History Narrative    Not on file       Vitals:    12/30/20 1259   BP: (!) 140/76   Pulse: 104   Temp: 98.6 °F (37 °C)   TempSrc: Temporal   SpO2: 98%   Weight: 146 lb (66.2 kg)   Height: 5' 2\" (1.575 m)       Physical Exam    Const: Appears well developed and well nourished. No signs of acute distress present. Eyes: EOMI in both eyes. PERRL. ENMT: . (External Ears) Tympanic membranes are intact. External nose WNL.  Moistness and  normal color of the nasal mucosae. Nasal septum. (Septum) . (Teeth) Gums appear healthy. Posterior pharynx shows postnasal drip, but no exudate, irritation or redness. Neck: Supple and symmetric. Palpation reveals no adenopathy. No masses appreciated. Thyroid  exhibits no nodule or thyromegaly. No JVD. Carotids: 2+ and equal bilaterally, without bruits. Resp: No rales, rhonchi or wheezes appreciated over the lungs bilaterally. CV: Rate is regular. Rhythm is regular. S1 is normal. S2 is normal. No gallop or rubs. No heart  murmur appreciated. Extremities: No clubbing, cyanosis or edema. Abdomen: Bowel sounds are normoactive. Palpation reveals softness, with no distension,  organomegaly or tenderness. No abdominal masses palpable. No palpable hepatosplenomegaly. Musculo: Walks with a normal gait. Upper Extremities: Full ROM bilaterally.  Const: Appears well developed and well nourished. No signs of acute distress present. Eyes: EOMI in both eyes. PERRL. ENMT: . (External Ears) Tympanic membranes are intact. External nose WNL. Moistness and  normal color of the nasal mucosae. Nasal septum. (Septum) . (Teeth) Gums appear healthy. Posterior pharynx shows postnasal drip, but no exudate, irritation or redness. Neck: Supple and symmetric. Palpation reveals no adenopathy. No masses appreciated. Thyroid  exhibits no nodule or thyromegaly. No JVD. Carotids: 2+ and equal bilaterally, without bruits. Resp: No rales, rhonchi or wheezes appreciated over the lungs bilaterally. CV: Rate is regular. Rhythm is regular. S1 is normal. S2 is normal. No gallop or rubs. No heart  murmur appreciated. Extremities: No clubbing, cyanosis or edema. Abdomen: Bowel sounds are normoactive. Palpation reveals softness, with no distension,  organomegaly or tenderness. No abdominal masses palpable. No palpable hepatosplenomegaly. Musculo: Walks with a normal gait.  Upper Extremities: Full ROM bilaterally.  She does have reproducible tenderness to palpation lateral epicondyle region right elbow.  Lower Extremities:  Full ROM bilaterally. Skin: Dry and warm with no rash /darkened nevus below umbilicus, which she states she's had all of her  life and is unchanged. Neuro: Alert and oriented x3. Mood is normal. Affect is normal. Speech is articulate and fluent. Upper Extremities: motor strength is intact. Normal muscle tone bilaterally. Lower Extremities: motor  strength is intact. Normal muscle tone bilaterally. Sensation intact to light touch. Reflexes: DTR's are  symmetric, intact and 2+ bilaterally. Cranial Nerves: Cranial nerves II-XII intact. Psych: Patient's attitude is cooperative. Patient's affect is appropriate. Judgement is realistic. Insight           Assessment and Plan:  Naga Winn was seen today for urinary tract infection and abdominal pain. Diagnoses and all orders for this visit:    Abdominal pain, unspecified abdominal location  -     CBC Auto Differential; Future  -     Comprehensive Metabolic Panel; Future  -     AMYLASE; Future    Diverticulitis    Nausea    Frequency of micturition  -     POCT Urinalysis no Micro    Other orders  -     Discontinue: amoxicillin-clavulanate (AUGMENTIN) 875-125 MG per tablet; Take 1 tablet by mouth 2 times daily for 10 days  -     amoxicillin-clavulanate (AUGMENTIN) 875-125 MG per tablet; Take 1 tablet by mouth 2 times daily for 10 days    Plan: I told her at this time clinically this looks like diverticulitis however cannot be absolutely sure without scanning her. After full discussion it was elected to hold off on the scan and trial antibiotics and observation over the next couple days. Recommended a soft diet and plenty fluids. Blood work to monitor disease progression and medication use. Augmentin. Warned of potential side effects. Probiotic. Notify us if not improving or any worsening ensues. She is to go to the emergency room immediately if any worsening. I will see her back in 2 weeks to reassess.   Notify me with problems in the interim. Return in about 2 weeks (around 1/13/2021). Seen By:  Albaro Rios MD      *Document was created using voice recognition software. Note was reviewed however may contain grammatical errors.

## 2021-01-14 ENCOUNTER — OFFICE VISIT (OUTPATIENT)
Dept: FAMILY MEDICINE CLINIC | Age: 51
End: 2021-01-14
Payer: COMMERCIAL

## 2021-01-14 VITALS
HEIGHT: 62 IN | OXYGEN SATURATION: 98 % | HEART RATE: 91 BPM | TEMPERATURE: 98.2 F | WEIGHT: 146 LBS | SYSTOLIC BLOOD PRESSURE: 112 MMHG | DIASTOLIC BLOOD PRESSURE: 74 MMHG | BODY MASS INDEX: 26.87 KG/M2

## 2021-01-14 DIAGNOSIS — M79.605 LEFT LEG PAIN: ICD-10-CM

## 2021-01-14 DIAGNOSIS — M70.62 GREATER TROCHANTERIC BURSITIS OF LEFT HIP: ICD-10-CM

## 2021-01-14 DIAGNOSIS — R10.9 ABDOMINAL PAIN, UNSPECIFIED ABDOMINAL LOCATION: Primary | ICD-10-CM

## 2021-01-14 PROCEDURE — 99213 OFFICE O/P EST LOW 20 MIN: CPT | Performed by: INTERNAL MEDICINE

## 2021-01-14 RX ORDER — NAPROXEN 500 MG/1
500 TABLET ORAL 2 TIMES DAILY WITH MEALS
Qty: 20 TABLET | Refills: 0 | Status: SHIPPED
Start: 2021-01-14 | End: 2021-09-01 | Stop reason: ALTCHOICE

## 2021-01-14 ASSESSMENT — ENCOUNTER SYMPTOMS
VOMITING: 0
ABDOMINAL PAIN: 1
NAUSEA: 0
DIARRHEA: 0
BLOOD IN STOOL: 0
BACK PAIN: 1
CONSTIPATION: 0

## 2021-01-14 NOTE — PROGRESS NOTES
[facility]     21  Mo Book : 1970 Sex: female  Age: 52 y.o. Chief Complaint   Patient presents with    2 Week Follow-Up     finished the antbiotic last tuesday and was still having some pain; she states the pain was coming up on the right side       HPI    Patient presents today for 2-week follow-up visit on her problems. When saw her last we did diagnose diverticulitis and treated with Augmentin. Left lower quadrant pain did resolve. She states that she has had some other issues recently where she had pain on the right side in the upper quadrant. She cannot correlate it with food or activity. There is no nausea vomiting or diarrhea with this. Was intermittent and short-lived. She is having no issues today. Denies any fever or chills. Also complaining of some left leg discomfort. Primarily lateral aspect no history of injury. Can extend from the buttock area down to proximal to the knee. She states she does have a TENS unit she states she has been using with some success. Review of Systems   Constitutional: Negative for chills and fever. Cardiovascular: Negative for chest pain. Gastrointestinal: Positive for abdominal pain. Negative for blood in stool, constipation, diarrhea, nausea and vomiting. See above   Genitourinary: Negative for difficulty urinating, dysuria, flank pain, hematuria and pelvic pain. Musculoskeletal: Positive for back pain. Low back. Skin: Negative for rash. Neurological: Negative for weakness and numbness. REST OF PERTINENT ROS GONE OVER AND WAS NEGATIVE.                Current Outpatient Medications:     azelastine HCl 0.15 % SOLN, USE ONE SPRAY INTO NOSTRIL(S) TWICE A DAY OR TWO SPRAYS ONCE A DAY., Disp: , Rfl:     famotidine (PEPCID) 20 MG tablet, , Disp: , Rfl:     montelukast (SINGULAIR) 10 MG tablet, TAKE ONE TABLET BY MOUTH EVERY DAY, Disp: 30 tablet, Rfl: 11    Multiple Vitamin 98%   Weight: 146 lb (66.2 kg)   Height: 5' 2\" (1.575 m)       Physical Exam  Vitals signs and nursing note reviewed. Constitutional:       General: She is not in acute distress. Cardiovascular:      Rate and Rhythm: Normal rate and regular rhythm. Heart sounds: No murmur. Pulmonary:      Effort: Pulmonary effort is normal. No respiratory distress. Breath sounds: No wheezing, rhonchi or rales. Abdominal:      General: There is no distension. Tenderness: There is no abdominal tenderness. There is no right CVA tenderness, left CVA tenderness, guarding or rebound. Hernia: No hernia is present. Musculoskeletal: Normal range of motion. General: Tenderness present. No deformity. Comments: Patient had negative straight leg raise pain. Negative pain to hip maneuvers of abduction abduction. She did have reproducible tenderness over the greater trochanteric bursal region on the left. Skin:     General: Skin is warm and dry. Neurological:      General: No focal deficit present. Mental Status: She is alert. Sensory: No sensory deficit. Motor: No weakness. Gait: Gait normal.   Psychiatric:         Mood and Affect: Mood normal.         Behavior: Behavior normal.         Thought Content: Thought content normal.         Judgment: Judgment normal.               Assessment and Plan:  Julian Seals was seen today for 2 week follow-up. Diagnoses and all orders for this visit:    Abdominal pain, unspecified abdominal location    Left leg pain    Greater trochanteric bursitis of left hip      Plan: I recommended naproxen for the next 10 days. Potential side effects. I asked her to ice the area down a couple times a day. Call me in about 2 weeks with an update if pain no better we will schedule her to have injection. I also did ask her to keep some kind of a log or diary regarding her abdominal complaints. She is also concerned with chiropractic for back discomfort. Notify us of problems in the interim. Return for keep appt. Seen By:  Obi Torres MD      *Document was created using voice recognition software. Note was reviewed however may contain grammatical errors.

## 2021-02-05 ENCOUNTER — OFFICE VISIT (OUTPATIENT)
Dept: FAMILY MEDICINE CLINIC | Age: 51
End: 2021-02-05
Payer: COMMERCIAL

## 2021-02-05 VITALS
BODY MASS INDEX: 26.28 KG/M2 | TEMPERATURE: 97.1 F | WEIGHT: 142.8 LBS | OXYGEN SATURATION: 96 % | HEIGHT: 62 IN | RESPIRATION RATE: 18 BRPM | HEART RATE: 100 BPM | SYSTOLIC BLOOD PRESSURE: 118 MMHG | DIASTOLIC BLOOD PRESSURE: 76 MMHG

## 2021-02-05 DIAGNOSIS — J01.90 ACUTE BACTERIAL SINUSITIS: ICD-10-CM

## 2021-02-05 DIAGNOSIS — B96.89 ACUTE BACTERIAL SINUSITIS: ICD-10-CM

## 2021-02-05 DIAGNOSIS — B02.9 HERPES ZOSTER WITHOUT COMPLICATION: Primary | ICD-10-CM

## 2021-02-05 PROCEDURE — 99213 OFFICE O/P EST LOW 20 MIN: CPT | Performed by: PHYSICIAN ASSISTANT

## 2021-02-05 RX ORDER — LORATADINE 10 MG/1
10 TABLET ORAL NIGHTLY
Qty: 30 TABLET | Refills: 1 | Status: SHIPPED | OUTPATIENT
Start: 2021-02-05 | End: 2021-09-01 | Stop reason: ALTCHOICE

## 2021-02-05 RX ORDER — VALACYCLOVIR HYDROCHLORIDE 1 G/1
1000 TABLET, FILM COATED ORAL 3 TIMES DAILY
Qty: 21 TABLET | Refills: 0 | Status: SHIPPED | OUTPATIENT
Start: 2021-02-05 | End: 2021-02-12

## 2021-02-05 RX ORDER — LORATADINE AND PSEUDOEPHEDRINE SULFATE 5; 120 MG/1; MG/1
1 TABLET, EXTENDED RELEASE ORAL DAILY
Qty: 14 TABLET | Refills: 0 | Status: SHIPPED | OUTPATIENT
Start: 2021-02-05 | End: 2021-09-01 | Stop reason: ALTCHOICE

## 2021-02-05 RX ORDER — AZITHROMYCIN 250 MG/1
250 TABLET, FILM COATED ORAL SEE ADMIN INSTRUCTIONS
Qty: 6 TABLET | Refills: 0 | Status: SHIPPED | OUTPATIENT
Start: 2021-02-05 | End: 2021-02-10

## 2021-02-05 RX ORDER — PREDNISONE 20 MG/1
20 TABLET ORAL DAILY
Qty: 5 TABLET | Refills: 0 | Status: SHIPPED | OUTPATIENT
Start: 2021-02-05 | End: 2021-02-10

## 2021-02-05 ASSESSMENT — PATIENT HEALTH QUESTIONNAIRE - PHQ9
SUM OF ALL RESPONSES TO PHQ QUESTIONS 1-9: 0
SUM OF ALL RESPONSES TO PHQ9 QUESTIONS 1 & 2: 0
SUM OF ALL RESPONSES TO PHQ QUESTIONS 1-9: 0

## 2021-02-05 ASSESSMENT — ENCOUNTER SYMPTOMS
SINUS PRESSURE: 1
SINUS PAIN: 1

## 2021-02-05 NOTE — PROGRESS NOTES
Pulmonary:      Effort: Pulmonary effort is normal.      Breath sounds: Normal breath sounds. Musculoskeletal: Normal range of motion. Skin:     General: Skin is warm and dry. Neurological:      General: No focal deficit present. Mental Status: She is alert and oriented to person, place, and time. Mental status is at baseline. Psychiatric:         Mood and Affect: Mood normal.         Behavior: Behavior normal.         Thought Content: Thought content normal.         Judgment: Judgment normal.           Assessment/Plan:  Julian Seals was seen today for sinus problem and herpes zoster. Diagnoses and all orders for this visit:    Herpes zoster without complication  -     valACYclovir (VALTREX) 1 g tablet; Take 1 tablet by mouth 3 times daily for 7 days    Acute bacterial sinusitis  -     loratadine-pseudoephedrine (CLARITIN-D 12 HOUR) 5-120 MG per extended release tablet; Take 1 tablet by mouth daily for 14 days  -     loratadine (CLARITIN) 10 MG tablet; Take 1 tablet by mouth nightly  -     predniSONE (DELTASONE) 20 MG tablet; Take 1 tablet by mouth daily for 5 days  -     azithromycin (ZITHROMAX) 250 MG tablet; Take 1 tablet by mouth See Admin Instructions for 5 days 500mg on day 1 followed by 250mg on days 2 - 5        Follow-up with PCP if no better in 1 week sooner if needed.     Remi Ackerman PA-C

## 2021-03-18 ENCOUNTER — HOSPITAL ENCOUNTER (OUTPATIENT)
Age: 51
Discharge: HOME | End: 2021-03-20

## 2021-03-18 PROCEDURE — 88305 TISSUE EXAM BY PATHOLOGIST: CPT

## 2021-09-01 ENCOUNTER — TELEPHONE (OUTPATIENT)
Dept: FAMILY MEDICINE CLINIC | Age: 51
End: 2021-09-01

## 2021-09-01 ENCOUNTER — OFFICE VISIT (OUTPATIENT)
Dept: FAMILY MEDICINE CLINIC | Age: 51
End: 2021-09-01
Payer: COMMERCIAL

## 2021-09-01 VITALS
SYSTOLIC BLOOD PRESSURE: 104 MMHG | WEIGHT: 146.2 LBS | TEMPERATURE: 97.9 F | DIASTOLIC BLOOD PRESSURE: 62 MMHG | HEART RATE: 80 BPM | OXYGEN SATURATION: 97 % | HEIGHT: 62 IN | BODY MASS INDEX: 26.91 KG/M2

## 2021-09-01 DIAGNOSIS — Z00.00 ROUTINE GENERAL MEDICAL EXAMINATION AT A HEALTH CARE FACILITY: Primary | ICD-10-CM

## 2021-09-01 PROCEDURE — 90471 IMMUNIZATION ADMIN: CPT | Performed by: INTERNAL MEDICINE

## 2021-09-01 PROCEDURE — 99396 PREV VISIT EST AGE 40-64: CPT | Performed by: INTERNAL MEDICINE

## 2021-09-01 PROCEDURE — 90674 CCIIV4 VAC NO PRSV 0.5 ML IM: CPT | Performed by: INTERNAL MEDICINE

## 2021-09-01 RX ORDER — MONTELUKAST SODIUM 10 MG/1
TABLET ORAL
Qty: 30 TABLET | Refills: 11 | Status: SHIPPED
Start: 2021-09-01 | End: 2022-10-05 | Stop reason: SDUPTHER

## 2021-09-01 RX ORDER — ZINC GLUCONATE 50 MG
50 TABLET ORAL DAILY
COMMUNITY

## 2021-09-01 RX ORDER — LEVOCETIRIZINE DIHYDROCHLORIDE 5 MG/1
5 TABLET, FILM COATED ORAL NIGHTLY
COMMUNITY

## 2021-09-01 NOTE — PROGRESS NOTES
[facility]     21  Haleigh Hodge : 1970 Sex: female  Age: 52 y.o. Chief Complaint   Patient presents with    Annual Exam       HPI  Patient presents today for annual physical/preventative visit. In general she is been doing well. She is still having significant allergy issues and has not been back to allergy and immunology recently. She states she is going to go back and reconsider possible immunotherapy. She has had D&C since I have seen her last and went well. She is up-to-date with GYN and urology but not allergy and immunology at this time. Review of Systems     Const: Denies chills, fever and sweats. Eyes: Denies eye symptoms. ENMT: Denies ear symptoms. Reports postnasal drip, but denies other nasal symptoms. Denies  mouth or throat symptoms. Admits to frequent sinus infections and allergy type symptoms. CV: Denies chest pain, orthopnea and palpitations. Resp: Denies cough, SOB and wheezing. GI: Denies diarrhea, nausea and vomiting. : Urinary  Musculo: Occasional arthralgias  Skin: Denies eczema, pruritus and sores. Neuro: Denies dizziness, headache, seizures and syncope.  Positive for occasional tingling to feet. -Declined any further evaluation at this time  Psych: Denies psychiatric symptoms. Significant allergies           REST OF PERTINENT ROS GONE OVER AND WAS NEGATIVE. PMH:  Problem List: Spasm, Low back pain, Headache, Neck pain  Health Maintenance:  Influenza Vaccination - (10/5/2018)  Colonoscopy - () , -due 22  Pelvic/Pap Exam - (3/19/0) GYN-  EKG -   Medical Problems:  Herpes Zoster, Gestational Diabetes. , Diverticulitis, IBS  Herpes Simplex - Recurrent left forehead  Seasonal Allergies  Kidney stones  Surgical Hx:  Tubal Ligation - (2003)  Laparoscopic colon resection for diverticulitis  D&C-spring 2021  Reviewed, no changes. FH:  She has a half sister with some kind of benign pancreatic tumor . ovarian Social Determinants of Health     Financial Resource Strain:     Difficulty of Paying Living Expenses:    Food Insecurity:     Worried About Running Out of Food in the Last Year:     [address] in the Last Year:    Transportation Needs:     Lack of Transportation (Medical):  Lack of Transportation (Non-Medical):    Physical Activity:     Days of Exercise per Week:     Minutes of Exercise per Session:    Stress:     Feeling of Stress :    Social Connections:     Frequency of Communication with Friends and Family:     Frequency of Social Gatherings with Friends and Family:     Attends Buddhism Services:     Active Member of Clubs or Organizations:     Attends Club or Organization Meetings:     Marital Status:    Intimate Partner Violence:     Fear of Current or Ex-Partner:     Emotionally Abused:     Physically Abused:     Sexually Abused:        Vitals:    09/01/21 0829   BP: 104/62   Pulse: 80   Temp: 97.9 °F (36.6 °C)   TempSrc: Temporal   SpO2: 97%   Weight: 146 lb 3.2 oz (66.3 kg)   Height: 5' 2\" (1.575 m)       Physical Exam    Const: Appears well developed and well nourished. No signs of acute distress present. Eyes: EOMI in both eyes. PERRL. ENMT: . (External Ears) Tympanic membranes are intact. External nose WNL. Moistness and  normal color of the nasal mucosae. Nasal septum. (Septum) . (Teeth) Gums appear healthy. Posterior pharynx shows postnasal drip, but no exudate, irritation or redness. Neck: Supple and symmetric. Palpation reveals no adenopathy. No masses appreciated. Thyroid  exhibits no nodule or thyromegaly. No JVD. Carotids: 2+ and equal bilaterally, without bruits. Resp: No rales, rhonchi or wheezes appreciated over the lungs bilaterally. CV: Rate is regular. Rhythm is regular. S1 is normal. S2 is normal. No gallop or rubs. No heart  murmur appreciated. Extremities: No clubbing, cyanosis or edema. Abdomen: Bowel sounds are normoactive.  Palpation reveals Insight           Assessment and Plan:  Jesus was seen today for annual exam.    Diagnoses and all orders for this visit:    Routine general medical examination at a health care facility  -     Comprehensive Metabolic Panel; Future  -     CBC Auto Differential; Future  -     Lipid Panel; Future    Other orders  -     montelukast (SINGULAIR) 10 MG tablet; TAKE ONE TABLET BY MOUTH EVERY DAY  -     INFLUENZA, MDCK QUADV, 2 YRS AND OLDER, IM, MDV, 0.5ML (Sundhi Irwine)    Plan: Follow-up with above consultants. She will be seeing chiropractic tomorrow related to some neck and lower back issues. Flu vaccine given today. Blood work today to monitor disease progression medication use. Prescription management performed. See back in 1 year and as needed. Notify us of problems in the interim. Return in about 1 year (around 9/1/2022). Seen By:  Mayte Lopez MD      *Document was created using voice recognition software. Note was reviewed however may contain grammatical errors.

## 2021-09-02 NOTE — TELEPHONE ENCOUNTER
Letter sent to patient letting her know that her labs came back and everything is normal. Attached a copy of the results for her records.

## 2022-02-04 ENCOUNTER — OFFICE VISIT (OUTPATIENT)
Dept: FAMILY MEDICINE CLINIC | Age: 52
End: 2022-02-04
Payer: COMMERCIAL

## 2022-02-04 ENCOUNTER — TELEPHONE (OUTPATIENT)
Dept: FAMILY MEDICINE CLINIC | Age: 52
End: 2022-02-04

## 2022-02-04 VITALS
BODY MASS INDEX: 27.79 KG/M2 | WEIGHT: 151 LBS | RESPIRATION RATE: 16 BRPM | SYSTOLIC BLOOD PRESSURE: 130 MMHG | DIASTOLIC BLOOD PRESSURE: 70 MMHG | HEART RATE: 85 BPM | TEMPERATURE: 97.3 F | HEIGHT: 62 IN | OXYGEN SATURATION: 97 %

## 2022-02-04 DIAGNOSIS — B96.89 ACUTE BACTERIAL SINUSITIS: Primary | ICD-10-CM

## 2022-02-04 DIAGNOSIS — J01.90 ACUTE BACTERIAL SINUSITIS: Primary | ICD-10-CM

## 2022-02-04 LAB
Lab: NORMAL
PERFORMING INSTRUMENT: NORMAL
QC PASS/FAIL: NORMAL
SARS-COV-2, POC: NORMAL

## 2022-02-04 PROCEDURE — 87426 SARSCOV CORONAVIRUS AG IA: CPT | Performed by: PHYSICIAN ASSISTANT

## 2022-02-04 PROCEDURE — 99213 OFFICE O/P EST LOW 20 MIN: CPT | Performed by: PHYSICIAN ASSISTANT

## 2022-02-04 RX ORDER — METHYLPREDNISOLONE 4 MG/1
TABLET ORAL
Qty: 1 KIT | Refills: 0 | Status: SHIPPED | OUTPATIENT
Start: 2022-02-04 | End: 2022-02-10

## 2022-02-04 RX ORDER — AZITHROMYCIN 250 MG/1
TABLET, FILM COATED ORAL
Qty: 6 TABLET | Refills: 0 | Status: SHIPPED
Start: 2022-02-04 | End: 2022-09-19 | Stop reason: ALTCHOICE

## 2022-02-04 NOTE — TELEPHONE ENCOUNTER
Patient can go to [facility] for her symptoms or if she wants to see me place her in a 15-minute slot Monday.

## 2022-09-19 ENCOUNTER — OFFICE VISIT (OUTPATIENT)
Dept: FAMILY MEDICINE CLINIC | Age: 52
End: 2022-09-19
Payer: COMMERCIAL

## 2022-09-19 VITALS
BODY MASS INDEX: 27.05 KG/M2 | WEIGHT: 147 LBS | DIASTOLIC BLOOD PRESSURE: 68 MMHG | HEART RATE: 85 BPM | SYSTOLIC BLOOD PRESSURE: 116 MMHG | OXYGEN SATURATION: 99 % | TEMPERATURE: 97.5 F | HEIGHT: 62 IN

## 2022-09-19 DIAGNOSIS — R09.89 CHEST CONGESTION: ICD-10-CM

## 2022-09-19 DIAGNOSIS — J34.89 SINUS DRAINAGE: ICD-10-CM

## 2022-09-19 DIAGNOSIS — U07.1 COVID: Primary | ICD-10-CM

## 2022-09-19 PROCEDURE — 99213 OFFICE O/P EST LOW 20 MIN: CPT | Performed by: INTERNAL MEDICINE

## 2022-09-19 RX ORDER — PREDNISONE 10 MG/1
TABLET ORAL
Qty: 12 TABLET | Refills: 0 | Status: SHIPPED | OUTPATIENT
Start: 2022-09-19 | End: 2022-09-25

## 2022-09-19 RX ORDER — CEFDINIR 300 MG/1
300 CAPSULE ORAL 2 TIMES DAILY
Qty: 14 CAPSULE | Refills: 0 | Status: SHIPPED | OUTPATIENT
Start: 2022-09-19 | End: 2022-09-26

## 2022-09-19 ASSESSMENT — PATIENT HEALTH QUESTIONNAIRE - PHQ9
SUM OF ALL RESPONSES TO PHQ QUESTIONS 1-9: 0
SUM OF ALL RESPONSES TO PHQ9 QUESTIONS 1 & 2: 0
1. LITTLE INTEREST OR PLEASURE IN DOING THINGS: 0
SUM OF ALL RESPONSES TO PHQ QUESTIONS 1-9: 0
SUM OF ALL RESPONSES TO PHQ QUESTIONS 1-9: 0
2. FEELING DOWN, DEPRESSED OR HOPELESS: 0
SUM OF ALL RESPONSES TO PHQ QUESTIONS 1-9: 0

## 2022-09-19 ASSESSMENT — ENCOUNTER SYMPTOMS
EYES NEGATIVE: 1
DIARRHEA: 0
SINUS PRESSURE: 1
CHEST TIGHTNESS: 0
WHEEZING: 0
NAUSEA: 0
ABDOMINAL PAIN: 0
SINUS PAIN: 0
VOMITING: 0
COUGH: 0
SORE THROAT: 0
SHORTNESS OF BREATH: 0

## 2022-09-19 NOTE — PROGRESS NOTES
[address] [place]     22  Cherry Gutiérrez : 1970 Sex: female  Age: 54 y.o. Chief Complaint   Patient presents with    Positive For Covid-19     Tested positive for COVID on Saturday; cough, cold, congestion, itchy spots on right side       HPI    Patient presents today for additional visit stating that she tested positive for COVID 2 days ago. States symptoms started actually about 5 days ago with a sore throat and then developed some achiness and head and facial pressure. Subsequently developed some mild chest congestion little bit worse 2 days ago and then tested +2 days ago. States she has lost taste and smell yesterday. Today she feels that she is getting some better. She had a concern about a red rash that had developed with this whole process. It is primarily on her side on the right but does have some on the left and a couple scattered spots elsewhere. They are red and raised and somewhat itchy. States that her daughter had similar rash when she had COVID. She denies fever or chills. Denies shortness of breath. Again states she feels she is getting better. Review of Systems   Constitutional:  Negative for chills and fever. HENT:  Positive for congestion, postnasal drip and sinus pressure. Negative for ear pain, sinus pain and sore throat. Eyes: Negative. Respiratory:  Negative for cough, chest tightness, shortness of breath and wheezing. Cardiovascular:  Negative for chest pain. Gastrointestinal:  Negative for abdominal pain, diarrhea, nausea and vomiting. Musculoskeletal:  Positive for myalgias. Neurological:  Negative for headaches. REST OF PERTINENT ROS GONE OVER AND WAS NEGATIVE. Current Outpatient Medications:     predniSONE (DELTASONE) 10 MG tablet, Take 3 tablets by mouth daily for 2 days, THEN 2 tablets daily for 2 days, THEN 1 tablet daily for 2 days. , Disp: 12 tablet, Rfl: 0    cefdinir (OMNICEF) 300 MG capsule, Take 1 capsule by mouth 2 times daily for 7 days, Disp: 14 capsule, Rfl: 0    montelukast (SINGULAIR) 10 MG tablet, TAKE ONE TABLET BY MOUTH EVERY DAY, Disp: 30 tablet, Rfl: 11    levocetirizine (XYZAL) 5 MG tablet, Take 5 mg by mouth nightly, Disp: , Rfl:     zinc 50 MG TABS tablet, Take 50 mg by mouth daily, Disp: , Rfl:     famotidine (PEPCID) 20 MG tablet, , Disp: , Rfl:     Multiple Vitamin (MULTI-VITAMIN DAILY PO), Take by mouth, Disp: , Rfl:     Ascorbic Acid (VITAMIN C PO), Take by mouth, Disp: , Rfl:     VITAMIN D PO, Take by mouth, Disp: , Rfl:     fluticasone (FLONASE) 50 MCG/ACT nasal spray, INSTILL TWO SPRAYS INTO EACH NOSTRIL DAILY, Disp: , Rfl: 4  No Known Allergies    No past medical history on file. No past surgical history on file. No family history on file. Social History     Socioeconomic History    Marital status:      Spouse name: Not on file    Number of children: Not on file    Years of education: Not on file    Highest education level: Not on file   Occupational History    Not on file   Tobacco Use    Smoking status: Never    Smokeless tobacco: Never   Substance and Sexual Activity    Alcohol use: Not on file    Drug use: Not on file    Sexual activity: Not on file   Other Topics Concern    Not on file   Social History Narrative    Not on file     Social Determinants of Health     Financial Resource Strain: Not on file   Food Insecurity: Not on file   Transportation Needs: Not on file   Physical Activity: Not on file   Stress: Not on file   Social Connections: Not on file   Intimate Partner Violence: Not on file   Housing Stability: Not on file       Vitals:    09/19/22 1513   BP: 116/68   Pulse: 85   Temp: 97.5 °F (36.4 °C)   TempSrc: Temporal   SpO2: 99%   Weight: 147 lb (66.7 kg)   Height: 5' 2\" (1.575 m)       Physical Exam  Vitals and nursing note reviewed. Constitutional:       General: She is not in acute distress. Appearance: She is well-developed.    HENT: Head: Normocephalic and atraumatic. Right Ear: Tympanic membrane, ear canal and external ear normal.      Left Ear: Tympanic membrane, ear canal and external ear normal.      Nose: Nose normal.      Mouth/Throat:      Mouth: Mucous membranes are moist.      Pharynx: Oropharynx is clear. Posterior oropharyngeal erythema present. No oropharyngeal exudate. Comments: Clear mucus draining posterior pharynx  Cardiovascular:      Rate and Rhythm: Normal rate and regular rhythm. Heart sounds: Normal heart sounds. No murmur heard. Pulmonary:      Effort: Pulmonary effort is normal. No respiratory distress. Breath sounds: Normal breath sounds. No wheezing, rhonchi or rales. Musculoskeletal:      Cervical back: Normal range of motion and neck supple. No tenderness. Lymphadenopathy:      Cervical: No cervical adenopathy. Skin:     General: Skin is warm and dry. Findings: Rash present. Comments: See above   Neurological:      Mental Status: She is alert and oriented to person, place, and time. Psychiatric:         Mood and Affect: Mood normal.         Behavior: Behavior normal.         Thought Content: Thought content normal.         Judgment: Judgment normal.                   Assessment and Plan:  Krishna Amaya was seen today for positive for covid-19. Diagnoses and all orders for this visit:    COVID    Sinus drainage    Chest congestion    Other orders  -     predniSONE (DELTASONE) 10 MG tablet; Take 3 tablets by mouth daily for 2 days, THEN 2 tablets daily for 2 days, THEN 1 tablet daily for 2 days. -     cefdinir (OMNICEF) 300 MG capsule; Take 1 capsule by mouth 2 times daily for 7 days    Plan: Quarantine instructions given. She is feeling quite a bit better she states today I told her to quarantine for couple more days. Notify me if rashes not improving.   I did give her prescription for cefdinir and prednisone but asked her to hold them for about 48 hours and if improving can toss them. Keep upcoming physical exam appointment. Notify us of problems in the interim. Return for keep appt. Seen By:  Stacy De Los Santos MD      *Document was created using voice recognition software. Note was reviewed however may contain grammatical errors.

## 2022-10-05 ENCOUNTER — OFFICE VISIT (OUTPATIENT)
Dept: FAMILY MEDICINE CLINIC | Age: 52
End: 2022-10-05
Payer: COMMERCIAL

## 2022-10-05 VITALS
OXYGEN SATURATION: 97 % | BODY MASS INDEX: 27.68 KG/M2 | DIASTOLIC BLOOD PRESSURE: 70 MMHG | TEMPERATURE: 98.3 F | WEIGHT: 150.4 LBS | HEART RATE: 91 BPM | HEIGHT: 62 IN | SYSTOLIC BLOOD PRESSURE: 116 MMHG

## 2022-10-05 DIAGNOSIS — Z00.00 ROUTINE GENERAL MEDICAL EXAMINATION AT A HEALTH CARE FACILITY: ICD-10-CM

## 2022-10-05 DIAGNOSIS — J34.89 SINUS DRAINAGE: ICD-10-CM

## 2022-10-05 DIAGNOSIS — J30.9 ALLERGIC RHINITIS, UNSPECIFIED SEASONALITY, UNSPECIFIED TRIGGER: ICD-10-CM

## 2022-10-05 DIAGNOSIS — Z00.00 ROUTINE GENERAL MEDICAL EXAMINATION AT A HEALTH CARE FACILITY: Primary | ICD-10-CM

## 2022-10-05 LAB
ALBUMIN SERPL-MCNC: 4.5 G/DL (ref 3.5–5.2)
ALP BLD-CCNC: 59 U/L (ref 35–104)
ALT SERPL-CCNC: 15 U/L (ref 0–32)
ANION GAP SERPL CALCULATED.3IONS-SCNC: 12 MMOL/L (ref 7–16)
AST SERPL-CCNC: 19 U/L (ref 0–31)
BASOPHILS ABSOLUTE: 0.03 E9/L (ref 0–0.2)
BASOPHILS RELATIVE PERCENT: 0.4 % (ref 0–2)
BILIRUB SERPL-MCNC: 0.9 MG/DL (ref 0–1.2)
BUN BLDV-MCNC: 11 MG/DL (ref 6–20)
CALCIUM SERPL-MCNC: 9.5 MG/DL (ref 8.6–10.2)
CHLORIDE BLD-SCNC: 102 MMOL/L (ref 98–107)
CHOLESTEROL, TOTAL: 225 MG/DL (ref 0–199)
CO2: 24 MMOL/L (ref 22–29)
CREAT SERPL-MCNC: 0.6 MG/DL (ref 0.5–1)
EOSINOPHILS ABSOLUTE: 0.12 E9/L (ref 0.05–0.5)
EOSINOPHILS RELATIVE PERCENT: 1.8 % (ref 0–6)
GFR AFRICAN AMERICAN: >60
GFR NON-AFRICAN AMERICAN: >60 ML/MIN/1.73
GLUCOSE BLD-MCNC: 84 MG/DL (ref 74–99)
HCT VFR BLD CALC: 43.7 % (ref 34–48)
HDLC SERPL-MCNC: 63 MG/DL
HEMOGLOBIN: 14.5 G/DL (ref 11.5–15.5)
IMMATURE GRANULOCYTES #: 0.03 E9/L
IMMATURE GRANULOCYTES %: 0.4 % (ref 0–5)
LDL CHOLESTEROL CALCULATED: 130 MG/DL (ref 0–99)
LYMPHOCYTES ABSOLUTE: 1.71 E9/L (ref 1.5–4)
LYMPHOCYTES RELATIVE PERCENT: 25.2 % (ref 20–42)
MCH RBC QN AUTO: 32.4 PG (ref 26–35)
MCHC RBC AUTO-ENTMCNC: 33.2 % (ref 32–34.5)
MCV RBC AUTO: 97.8 FL (ref 80–99.9)
MONOCYTES ABSOLUTE: 0.57 E9/L (ref 0.1–0.95)
MONOCYTES RELATIVE PERCENT: 8.4 % (ref 2–12)
NEUTROPHILS ABSOLUTE: 4.32 E9/L (ref 1.8–7.3)
NEUTROPHILS RELATIVE PERCENT: 63.8 % (ref 43–80)
PDW BLD-RTO: 12.1 FL (ref 11.5–15)
PLATELET # BLD: 417 E9/L (ref 130–450)
PMV BLD AUTO: 9.1 FL (ref 7–12)
POTASSIUM SERPL-SCNC: 4.6 MMOL/L (ref 3.5–5)
RBC # BLD: 4.47 E12/L (ref 3.5–5.5)
SODIUM BLD-SCNC: 138 MMOL/L (ref 132–146)
TOTAL PROTEIN: 7.6 G/DL (ref 6.4–8.3)
TRIGL SERPL-MCNC: 160 MG/DL (ref 0–149)
VLDLC SERPL CALC-MCNC: 32 MG/DL
WBC # BLD: 6.8 E9/L (ref 4.5–11.5)

## 2022-10-05 PROCEDURE — 99396 PREV VISIT EST AGE 40-64: CPT | Performed by: INTERNAL MEDICINE

## 2022-10-05 RX ORDER — MONTELUKAST SODIUM 10 MG/1
TABLET ORAL
Qty: 30 TABLET | Refills: 11 | Status: SHIPPED | OUTPATIENT
Start: 2022-10-05

## 2022-10-05 RX ORDER — AZELASTINE HCL 205.5 UG/1
SPRAY NASAL
COMMUNITY

## 2022-10-05 NOTE — PROGRESS NOTES
[address] [place]     10/5/22  Arletta Speaker : 1970 Sex: female  Age: 50 y.o. Chief Complaint   Patient presents with    Annual Exam     Physical exam       HPI    Patient presents today for annual complete preventative visit. We saw her recently for COVID which she is recovering from. Still has some sinus congestion a little bit of drainage as well as mild cough but in general much better. She is taking some antihistamines at this time which seem to be helping her. She never did fill the antibiotic or the steroid. Patient is currently up-to-date with allergy and immunology and is getting shots. Also urology. Not up-to-date with GYN but plans to do so upcoming. Review of Systems    Const: Denies chills, fever and sweats. Eyes: Denies eye symptoms. ENMT: Denies ear symptoms. Reports postnasal drip, but denies other nasal symptoms. Denies  mouth or throat symptoms. Admits to frequent sinus infections and allergy type symptoms. --See above regarding recent COVID  CV: Denies chest pain, orthopnea and palpitations. Resp: Denies cough, SOB and wheezing. GI: Denies diarrhea, nausea and vomiting. : Urinary  Musculo: Occasional arthralgias  Skin: Denies eczema, pruritus and sores. Neuro: Denies dizziness, headache, seizures and syncope. Positive for occasional tingling to feet. -Declined any further evaluation at this time  Psych: Denies psychiatric symptoms. Significant allergies            REST OF PERTINENT ROS GONE OVER AND WAS NEGATIVE. PMH:  Problem List: Spasm, Low back pain, Headache, Neck pain  Health Maintenance:  Influenza Vaccination - (10/5/2018)  Colonoscopy - () , -due   Pelvic/Pap Exam - (3/19/0) GYN-  EKG -   Medical Problems:  Herpes Zoster, Gestational Diabetes. , Diverticulitis, IBS  Herpes Simplex - Recurrent left forehead  Seasonal Allergies  Kidney stones  COVID-  Surgical Hx:  Tubal Ligation - (2003)  Laparoscopic colon resection for diverticulitis  D&C-spring 2021  Reviewed, no changes. FH:  She has a half sister with some kind of benign pancreatic tumor . ovarian cyst  Also, diabetes runs on the father's side. Nothing else in the family history she is aware of. .  Father:  Renal cell cancer. Mother:  Insulin Dependent Diabetes, Hypertension, Arrhythmia. Guicho Tobar  1970 Page #2  Sister 1:  No Current Problems - twin sister. Maternal Grandmother:  Bladder Cancer.,  Breast cancer  Maternal Grandfather:  Heart Disease - . Paternal Aunt 1:  Crohn's Disease. Reviewed, no changes. SH:  [facility] . [facility]  Personal Habits: Cigarette Use: Negative For current cigarette smoker. Alcohol: Occasionally  consumes alcohol. Reviewed, no changes. Current Outpatient Medications:     azelastine HCl 0.15 % SOLN, by Nasal route, Disp: , Rfl:     montelukast (SINGULAIR) 10 MG tablet, TAKE ONE TABLET BY MOUTH EVERY DAY, Disp: 30 tablet, Rfl: 11    levocetirizine (XYZAL) 5 MG tablet, Take 5 mg by mouth nightly, Disp: , Rfl:     zinc 50 MG TABS tablet, Take 50 mg by mouth daily, Disp: , Rfl:     famotidine (PEPCID) 20 MG tablet, , Disp: , Rfl:     Multiple Vitamin (MULTI-VITAMIN DAILY PO), Take by mouth, Disp: , Rfl:     Ascorbic Acid (VITAMIN C PO), Take by mouth, Disp: , Rfl:     VITAMIN D PO, Take by mouth, Disp: , Rfl:   No Known Allergies    No past medical history on file. No past surgical history on file. No family history on file.   Social History     Socioeconomic History    Marital status:      Spouse name: Not on file    Number of children: Not on file    Years of education: Not on file    Highest education level: Not on file   Occupational History    Not on file   Tobacco Use    Smoking status: Never    Smokeless tobacco: Never   Substance and Sexual Activity    Alcohol use: Not on file    Drug use: Not on file    Sexual activity: Not on file   Other Topics Concern Not on file   Social History Narrative    Not on file     Social Determinants of Health     Financial Resource Strain: Not on file   Food Insecurity: Not on file   Transportation Needs: Not on file   Physical Activity: Not on file   Stress: Not on file   Social Connections: Not on file   Intimate Partner Violence: Not on file   Housing Stability: Not on file       Vitals:    10/05/22 1118   BP: 116/70   Pulse: 91   Temp: 98.3 °F (36.8 °C)   TempSrc: Temporal   SpO2: 97%   Weight: 150 lb 6.4 oz (68.2 kg)   Height: 5' 2\" (1.575 m)       Physical Exam     Const: Appears well developed and well nourished. No signs of acute distress present. Eyes: EOMI in both eyes. PERRL. ENMT: . (External Ears) Tympanic membranes are intact. External nose WNL. Moistness and  normal color of the nasal mucosae. Nasal septum. (Septum) . (Teeth) Gums appear healthy. Posterior pharynx shows postnasal drip, but no exudate, irritation or redness. Neck: Supple and symmetric. Palpation reveals no adenopathy. No masses appreciated. Thyroid  exhibits no nodule or thyromegaly. No JVD. Carotids: 2+ and equal bilaterally, without bruits. Resp: No rales, rhonchi or wheezes appreciated over the lungs bilaterally. CV: Rate is regular. Rhythm is regular. S1 is normal. S2 is normal. No gallop or rubs. No heart  murmur appreciated. Extremities: No clubbing, cyanosis or edema. Abdomen: Bowel sounds are normoactive. Palpation reveals softness, with no distension,  organomegaly or tenderness. No abdominal masses palpable. No palpable hepatosplenomegaly. Musculo: Walks with a normal gait. Upper Extremities: Full ROM bilaterally. Const: Appears well developed and well nourished. No signs of acute distress present. Skin: Dry and warm with no rash /darkened nevus below umbilicus, which she states she's had all of her  life and is unchanged. She did states she was going to see dermatology sometime in the near future for a skin check.   I did not see anything suspicious at this time. Neuro: Alert and oriented x3. Mood is normal. Affect is normal. Speech is articulate and fluent. Upper Extremities: motor strength is intact. Normal muscle tone bilaterally. Lower Extremities: motor  strength is intact. Normal muscle tone bilaterally. Sensation intact to light touch. Reflexes: DTR's are  symmetric, intact and 2+ bilaterally. Cranial Nerves: Cranial nerves II-XII intact. Psych: Patient's attitude is cooperative. Patient's affect is appropriate. Judgement is realistic. Insight            Assessment and Plan:  Concepcion Bagley was seen today for annual exam.    Diagnoses and all orders for this visit:    Routine general medical examination at a health care facility  -     Comprehensive Metabolic Panel; Future  -     CBC with Auto Differential; Future  -     Lipid Panel; Future    Sinus drainage    Allergic rhinitis, unspecified seasonality, unspecified trigger    Other orders  -     montelukast (SINGULAIR) 10 MG tablet; TAKE ONE TABLET BY MOUTH EVERY DAY    Plan: We will set her up with new physician for next year to establish and follow with. Stressed compliance with following up with her consultants. Questions about shingles vaccine which I gave her information on. Blood work today. Prescription management performed. Notify us of problems in the interim. No follow-ups on file. Seen By:  Rikki Troncoso MD      *Document was created using voice recognition software. Note was reviewed however may contain grammatical errors.

## 2022-10-06 ENCOUNTER — TELEPHONE (OUTPATIENT)
Dept: FAMILY MEDICINE CLINIC | Age: 52
End: 2022-10-06

## 2022-10-06 NOTE — TELEPHONE ENCOUNTER
LDL cholesterol has bumped up from . Rest of labs are okay. Dietary discretion only at this time.   No medications

## 2023-02-03 LAB — MAMMOGRAPHY, EXTERNAL: NORMAL

## 2023-02-09 ENCOUNTER — OFFICE VISIT (OUTPATIENT)
Dept: FAMILY MEDICINE CLINIC | Age: 53
End: 2023-02-09
Payer: COMMERCIAL

## 2023-02-09 VITALS
BODY MASS INDEX: 28.52 KG/M2 | TEMPERATURE: 97.6 F | HEIGHT: 62 IN | HEART RATE: 92 BPM | SYSTOLIC BLOOD PRESSURE: 122 MMHG | DIASTOLIC BLOOD PRESSURE: 80 MMHG | RESPIRATION RATE: 18 BRPM | WEIGHT: 155 LBS | OXYGEN SATURATION: 96 %

## 2023-02-09 DIAGNOSIS — J06.9 URI WITH COUGH AND CONGESTION: Primary | ICD-10-CM

## 2023-02-09 PROCEDURE — 99213 OFFICE O/P EST LOW 20 MIN: CPT

## 2023-02-09 RX ORDER — PREDNISONE 10 MG/1
TABLET ORAL
Qty: 18 TABLET | Refills: 0 | Status: SHIPPED | OUTPATIENT
Start: 2023-02-09 | End: 2023-02-18

## 2023-02-09 RX ORDER — CEFDINIR 300 MG/1
300 CAPSULE ORAL 2 TIMES DAILY
Qty: 20 CAPSULE | Refills: 0 | Status: SHIPPED | OUTPATIENT
Start: 2023-02-09 | End: 2023-02-19

## 2023-02-09 NOTE — PROGRESS NOTES
2023     Ammon Montoya 50 y.o. female    : 1970   Chief Complaint:   Cough (X 1 month) and Congestion      History of Present Illness   Source of history provided by:  patient. Ammon Montoya is a 50 y.o. old female who presents to [address] [place] [address] for evaluation of cough x 4 weeks. Associated symptoms include congestion. Since onset symptoms have been consistent. Patient has had no known Covid 19 exposure. Patient has not been diagnosed with COVID-19 in the last 90 days. Has taken OTC medications at home with some symptomatic relief. Denies any fever, chills, CP, dyspnea, LE edema, abdominal pain, nausea, vomiting, rash, dizziness, or lethargy. Denies any history of asthma, pneumonia, recurrent bronchitis or COPD. They have no history of tobacco abuse. ROS   Past Medical History: History reviewed. No pertinent past medical history. Past Surgical History:  has no past surgical history on file. Social History:  reports that she has never smoked. She has never used smokeless tobacco.  Family History: family history is not on file. Allergies: Patient has no known allergies. Unless otherwise stated in this report the patient's positive and negative responses for review of systems for constitutional, eyes, ENT, cardiovascular, respiratory, gastrointestinal, neurological, , musculoskeletal, and integument systems and related systems to the presenting problem are either stated in the history of present illness or were not pertinent or were negative for the symptoms and/or complaints related to the presenting medical problem. Positives and pertinent negatives as per HPI. All others reviewed and are negative.     Physical Exam   VS:    Vitals:    23 1035   BP: 122/80   Pulse: 92   Resp: 18   Temp: 97.6 °F (36.4 °C)   TempSrc: Temporal   SpO2: 96%   Weight: 155 lb (70.3 kg)   Height: 5' 2\" (1.575 m)     Oxygen Saturation Interpretation: Normal.    Constitutional:  Alert, development consistent with age. NAD. Head:  NC/NT. Airway patent. Moderate TTP over maxillary and frontal sinuses. Ear: Bilateral external auditory ear canals are clear there is no cerumen, erythema or debris present. Bilateral tympanic membrane intact, translucent and normal cone of light. There is no serous effusion or drainage present. Nose: Bilateral turbinates are swollen. No septal deviation. Rhinorrhea present. Mouth: Posterior pharynx with mild erythema and clear postnasal drip. No tonsillar hypertrophy or exudate. Neck:  Normal ROM. Supple. No anterior cervical adenopathy noted. Lungs: CTAB without wheezes, rales, or rhonchi. CV:  Regular rate and rhythm, normal heart sounds, without pathological murmurs, ectopy, gallops, or rubs. GI: Bowel sounds active x4. Abdomen is soft nontender nondistended. There is no guarding or rebound tenderness noted. Skin:  Normal turgor. Warm, dry, without visible rash. Lymphatic: No lymphangitis or adenopathy noted. Neurological:  Oriented. Motor functions intact. Lab / Imaging Results   All laboratory and radiology results have been personally reviewed by myself. Labs:  No results found for this visit on 02/09/23. Imaging: All Radiology results interpreted by Radiologist unless otherwise noted. No orders to display     Assessment / Plan   Palmira Caceres was seen today for cough and congestion. Diagnoses and all orders for this visit:    URI with cough and congestion  -     predniSONE (DELTASONE) 10 MG tablet; Take 1 tablet by mouth three times daily for 3 days, THEN 1 tablet 2 times daily for 3 days, THEN 1 tablet daily for 3 days. -     cefdinir (OMNICEF) 300 MG capsule; Take 1 capsule by mouth 2 times daily for 10 days    Disposition:  Disposition: Discharge to home    Script for Prednisone and Cefdinir prescribed, side effects discussed. Follow-up with PCP in 7 to 10 days.   Red flag symptoms were discussed with the patient including high or refractory fever, progressive SOB, dyspnea, CP, calf pain/swelling, shaking chills, vomiting, abdominal pain, lethargy, flank pain, or decreased urinary output. If any of these occur, they should go immediately to the emergency department for further evaluation. All questions were answered. The patient relies understanding and was agreeable to the above plan. AYAN Helm - CNP    *NOTE: This report was transcribed using voice recognition software. Every effort was made to ensure accuracy; however, inadvertent computerized transcription errors may be present.

## 2023-05-10 ENCOUNTER — OFFICE VISIT (OUTPATIENT)
Dept: FAMILY MEDICINE CLINIC | Age: 53
End: 2023-05-10
Payer: COMMERCIAL

## 2023-05-10 VITALS
SYSTOLIC BLOOD PRESSURE: 134 MMHG | RESPIRATION RATE: 20 BRPM | OXYGEN SATURATION: 98 % | TEMPERATURE: 98.2 F | DIASTOLIC BLOOD PRESSURE: 82 MMHG | BODY MASS INDEX: 28.16 KG/M2 | WEIGHT: 153 LBS | HEART RATE: 71 BPM | HEIGHT: 62 IN

## 2023-05-10 DIAGNOSIS — R30.0 DYSURIA: Primary | ICD-10-CM

## 2023-05-10 DIAGNOSIS — R30.0 DYSURIA: ICD-10-CM

## 2023-05-10 DIAGNOSIS — R39.9 UTI SYMPTOMS: ICD-10-CM

## 2023-05-10 LAB
BILIRUBIN, POC: NORMAL
BLOOD URINE, POC: NORMAL
CLARITY, POC: CLEAR
COLOR, POC: YELLOW
GLUCOSE URINE, POC: NORMAL
KETONES, POC: NORMAL
LEUKOCYTE EST, POC: NORMAL
NITRITE, POC: NORMAL
PH, POC: 6
PROTEIN, POC: NORMAL
SPECIFIC GRAVITY, POC: 1.02
UROBILINOGEN, POC: NORMAL

## 2023-05-10 PROCEDURE — 99213 OFFICE O/P EST LOW 20 MIN: CPT | Performed by: PHYSICIAN ASSISTANT

## 2023-05-10 PROCEDURE — 81002 URINALYSIS NONAUTO W/O SCOPE: CPT | Performed by: PHYSICIAN ASSISTANT

## 2023-05-10 RX ORDER — CEFDINIR 300 MG/1
300 CAPSULE ORAL 2 TIMES DAILY
Qty: 14 CAPSULE | Refills: 0 | Status: SHIPPED | OUTPATIENT
Start: 2023-05-10 | End: 2023-05-17

## 2023-05-10 RX ORDER — AZELASTINE 1 MG/ML
SPRAY, METERED NASAL
COMMUNITY
Start: 2023-02-11

## 2023-05-10 ASSESSMENT — ENCOUNTER SYMPTOMS
BACK PAIN: 1
SHORTNESS OF BREATH: 0
NAUSEA: 0
PHOTOPHOBIA: 0
DIARRHEA: 0
ABDOMINAL PAIN: 0
SORE THROAT: 0
VOMITING: 0
COUGH: 0

## 2023-05-10 NOTE — PROGRESS NOTES
5/10/23  Manju Sánchez : 1970 Sex: female  Age 50 y.o. Subjective:  Chief Complaint   Patient presents with    Dysuria                  74-year-old female presents to the walk-in clinic for possible UTI. She states for 2 days she has had the feeling that she has incomplete emptying of her bladder. She states she also had some low back pain but related that to working. She denies flank pain. No abdominal pain. No fever or chills. No nausea or vomiting. She denies frequency, urgency and dysuria. She denies any chance of pregnancy. She states her last menstrual cycle was about 3 weeks ago. She states she is drinking fluids. She has a history of a tubal ligation and there is no chance of pregnancy. Review of Systems   Constitutional:  Negative for chills and fever. HENT:  Negative for congestion, ear pain and sore throat. Eyes:  Negative for photophobia and visual disturbance. Respiratory:  Negative for cough and shortness of breath. Cardiovascular:  Negative for chest pain. Gastrointestinal:  Negative for abdominal pain, diarrhea, nausea and vomiting. Genitourinary:  Negative for difficulty urinating, dysuria, frequency and urgency. Sensation of incomplete emptying of her bladder   Musculoskeletal:  Positive for back pain. Negative for neck pain and neck stiffness. Skin:  Negative for rash. Neurological:  Negative for dizziness, syncope, weakness, light-headedness and headaches. Hematological:  Negative for adenopathy. Does not bruise/bleed easily. Psychiatric/Behavioral:  Negative for agitation and confusion. All other systems reviewed and are negative. PMH:   History reviewed. No pertinent past medical history. History reviewed. No pertinent surgical history. History reviewed. No pertinent family history.     Medications:     Current Outpatient Medications:     azelastine (ASTELIN) 0.1 % nasal spray, USE TWO PUFFS IN EACH NOSTRIL TWO TIMES

## 2023-05-13 LAB — BACTERIA UR CULT: NORMAL

## 2023-06-09 ENCOUNTER — TELEPHONE (OUTPATIENT)
Dept: FAMILY MEDICINE CLINIC | Age: 53
End: 2023-06-09

## 2023-06-09 ENCOUNTER — OFFICE VISIT (OUTPATIENT)
Dept: FAMILY MEDICINE CLINIC | Age: 53
End: 2023-06-09
Payer: COMMERCIAL

## 2023-06-09 ENCOUNTER — HOSPITAL ENCOUNTER (OUTPATIENT)
Dept: GENERAL RADIOLOGY | Age: 53
End: 2023-06-09
Payer: COMMERCIAL

## 2023-06-09 ENCOUNTER — HOSPITAL ENCOUNTER (OUTPATIENT)
Dept: CT IMAGING | Age: 53
Discharge: HOME | End: 2023-06-09
Payer: COMMERCIAL

## 2023-06-09 ENCOUNTER — HOSPITAL ENCOUNTER (OUTPATIENT)
Age: 53
End: 2023-06-09
Payer: COMMERCIAL

## 2023-06-09 VITALS
HEIGHT: 62 IN | BODY MASS INDEX: 28.34 KG/M2 | SYSTOLIC BLOOD PRESSURE: 130 MMHG | HEART RATE: 106 BPM | TEMPERATURE: 98.1 F | RESPIRATION RATE: 17 BRPM | OXYGEN SATURATION: 98 % | WEIGHT: 154 LBS | DIASTOLIC BLOOD PRESSURE: 82 MMHG

## 2023-06-09 DIAGNOSIS — R20.2 TINGLING OF LEFT UPPER EXTREMITY: ICD-10-CM

## 2023-06-09 DIAGNOSIS — M25.511 ACUTE PAIN OF RIGHT SHOULDER: ICD-10-CM

## 2023-06-09 DIAGNOSIS — R20.2 TINGLING OF RIGHT UPPER EXTREMITY: ICD-10-CM

## 2023-06-09 DIAGNOSIS — M54.2 NECK PAIN: ICD-10-CM

## 2023-06-09 DIAGNOSIS — R29.898 UPPER EXTREMITY WEAKNESS: ICD-10-CM

## 2023-06-09 PROCEDURE — 99214 OFFICE O/P EST MOD 30 MIN: CPT | Performed by: INTERNAL MEDICINE

## 2023-06-09 PROCEDURE — 70450 CT HEAD/BRAIN W/O DYE: CPT

## 2023-06-09 PROCEDURE — 72050 X-RAY EXAM NECK SPINE 4/5VWS: CPT

## 2023-06-09 PROCEDURE — 93000 ELECTROCARDIOGRAM COMPLETE: CPT | Performed by: INTERNAL MEDICINE

## 2023-06-09 RX ORDER — PREDNISONE 10 MG/1
TABLET ORAL
Qty: 12 TABLET | Refills: 0 | Status: SHIPPED | OUTPATIENT
Start: 2023-06-09 | End: 2023-06-15

## 2023-06-09 ASSESSMENT — ENCOUNTER SYMPTOMS
DIARRHEA: 0
COLOR CHANGE: 0
SHORTNESS OF BREATH: 0
COUGH: 0
ABDOMINAL PAIN: 0
NAUSEA: 0
VOMITING: 0

## 2023-06-09 NOTE — PROGRESS NOTES
Future    Tingling of left upper extremity  -     XR CERVICAL SPINE (4-5 VIEWS); Future  -     Cancel: CT HEAD WO CONTRAST; Future  -     CT HEAD WO CONTRAST; Future    Tingling of right upper extremity  -     XR CERVICAL SPINE (4-5 VIEWS); Future  -     Cancel: CT HEAD WO CONTRAST; Future  -     CT HEAD WO CONTRAST; Future    Upper extremity weakness  -     XR CERVICAL SPINE (4-5 VIEWS); Future  -     Cancel: CT HEAD WO CONTRAST; Future  -     CT HEAD WO CONTRAST; Future    Other orders  -     EKG 12 Lead - Clinic Performed; Future  -     EKG 12 Lead - Clinic Performed  -     predniSONE (DELTASONE) 10 MG tablet; Take 3 tablets by mouth daily for 2 days, THEN 2 tablets daily for 2 days, THEN 1 tablet daily for 2 days. Plan: I told her at this point I was uncertain as to the etiology of her complaints. I do want to get a CAT scan of her head to exclude something central.  Also will get neck films and empirically start her on a short course of prednisone. Warned of potential side effects. We did also do ECG which did not show any acute ischemic changes. Instructed to go to the emergency room with any change or worsening in status. Follow-up PCP. Notify us if not improving. Return for fu pcp. Seen By:  Nishi Lemus MD      *Document was created using voice recognition software. Note was reviewed however may contain grammatical errors.

## 2023-06-09 NOTE — TELEPHONE ENCOUNTER
Left voice message for patient regarding results for CAT scan of the head and cervical spine neck x-rays both of which were negative.

## 2023-10-20 ENCOUNTER — OFFICE VISIT (OUTPATIENT)
Dept: FAMILY MEDICINE CLINIC | Age: 53
End: 2023-10-20
Payer: COMMERCIAL

## 2023-10-20 VITALS
DIASTOLIC BLOOD PRESSURE: 64 MMHG | SYSTOLIC BLOOD PRESSURE: 120 MMHG | TEMPERATURE: 97.4 F | HEIGHT: 62 IN | WEIGHT: 148.2 LBS | OXYGEN SATURATION: 97 % | BODY MASS INDEX: 27.27 KG/M2 | HEART RATE: 81 BPM

## 2023-10-20 DIAGNOSIS — R49.0 HOARSE VOICE QUALITY: ICD-10-CM

## 2023-10-20 DIAGNOSIS — Z00.00 ENCOUNTER FOR WELL ADULT EXAM WITHOUT ABNORMAL FINDINGS: Primary | ICD-10-CM

## 2023-10-20 DIAGNOSIS — M25.571 ACUTE RIGHT ANKLE PAIN: ICD-10-CM

## 2023-10-20 DIAGNOSIS — Z00.00 ANNUAL PHYSICAL EXAM: ICD-10-CM

## 2023-10-20 PROCEDURE — 99396 PREV VISIT EST AGE 40-64: CPT | Performed by: FAMILY MEDICINE

## 2023-10-20 PROCEDURE — 90674 CCIIV4 VAC NO PRSV 0.5 ML IM: CPT | Performed by: FAMILY MEDICINE

## 2023-10-20 PROCEDURE — 90471 IMMUNIZATION ADMIN: CPT | Performed by: FAMILY MEDICINE

## 2023-10-20 RX ORDER — MONTELUKAST SODIUM 10 MG/1
TABLET ORAL
Qty: 30 TABLET | Refills: 11 | Status: SHIPPED | OUTPATIENT
Start: 2023-10-20

## 2023-10-20 SDOH — ECONOMIC STABILITY: INCOME INSECURITY: HOW HARD IS IT FOR YOU TO PAY FOR THE VERY BASICS LIKE FOOD, HOUSING, MEDICAL CARE, AND HEATING?: NOT HARD AT ALL

## 2023-10-20 SDOH — ECONOMIC STABILITY: FOOD INSECURITY: WITHIN THE PAST 12 MONTHS, THE FOOD YOU BOUGHT JUST DIDN'T LAST AND YOU DIDN'T HAVE MONEY TO GET MORE.: NEVER TRUE

## 2023-10-20 SDOH — ECONOMIC STABILITY: FOOD INSECURITY: WITHIN THE PAST 12 MONTHS, YOU WORRIED THAT YOUR FOOD WOULD RUN OUT BEFORE YOU GOT MONEY TO BUY MORE.: NEVER TRUE

## 2023-10-20 SDOH — ECONOMIC STABILITY: HOUSING INSECURITY
IN THE LAST 12 MONTHS, WAS THERE A TIME WHEN YOU DID NOT HAVE A STEADY PLACE TO SLEEP OR SLEPT IN A SHELTER (INCLUDING NOW)?: NO

## 2023-10-20 ASSESSMENT — ENCOUNTER SYMPTOMS
CONSTIPATION: 0
RHINORRHEA: 0
COUGH: 0
VOMITING: 0
SINUS PAIN: 0
FACIAL SWELLING: 0
PHOTOPHOBIA: 0
ABDOMINAL DISTENTION: 0
DIARRHEA: 0
CHEST TIGHTNESS: 0
BLOOD IN STOOL: 0
APNEA: 0
SINUS PRESSURE: 0
COLOR CHANGE: 0
SHORTNESS OF BREATH: 0

## 2023-10-20 ASSESSMENT — PATIENT HEALTH QUESTIONNAIRE - PHQ9
SUM OF ALL RESPONSES TO PHQ QUESTIONS 1-9: 0
SUM OF ALL RESPONSES TO PHQ9 QUESTIONS 1 & 2: 0
SUM OF ALL RESPONSES TO PHQ QUESTIONS 1-9: 0
2. FEELING DOWN, DEPRESSED OR HOPELESS: 0
SUM OF ALL RESPONSES TO PHQ QUESTIONS 1-9: 0
SUM OF ALL RESPONSES TO PHQ QUESTIONS 1-9: 0
1. LITTLE INTEREST OR PLEASURE IN DOING THINGS: 0

## 2023-10-20 NOTE — PROGRESS NOTES
Well Adult Note  Name: Ellen Caraballo Date: 10/20/2023   MRN: [id number] Sex: Female   Age: 48 y.o. Ethnicity: Non- / Non    : 1970 Race: White (non-)      Ted Rios is here for well adult exam.  History:  NTP:   - pervious dr. Charles Sood patient     Allergies:   SEvere  Sees Dr. Libby Bridges   Suggest EGD   - D/t hoarseness     Left Ankle Pain:   Hurt her ankle at her daughters wedding 2 weeks ago   Doing ok   Is having some tingling bilaterally       Review of Systems   Constitutional:  Negative for chills, fatigue and fever. HENT:  Negative for congestion, ear pain, facial swelling, rhinorrhea, sinus pressure and sinus pain. Eyes:  Negative for photophobia and visual disturbance. Respiratory:  Negative for apnea, cough, chest tightness and shortness of breath. Cardiovascular:  Negative for chest pain and palpitations. Gastrointestinal:  Negative for abdominal distention, blood in stool, constipation, diarrhea and vomiting. Endocrine: Negative for cold intolerance, polydipsia, polyphagia and polyuria. Genitourinary:  Negative for decreased urine volume, frequency and urgency. Musculoskeletal:  Negative for arthralgias and gait problem. Skin:  Negative for color change. Allergic/Immunologic: Negative for environmental allergies and food allergies. Neurological:  Negative for dizziness, light-headedness and headaches. Hematological:  Negative for adenopathy. Psychiatric/Behavioral:  Negative for agitation and confusion. No Known Allergies      Prior to Visit Medications    Medication Sig Taking?  Authorizing Provider   montelukast (SINGULAIR) 10 MG tablet TAKE ONE TABLET BY MOUTH EVERY DAY Yes Katia Deluca MD   azelastine (ASTELIN) 0.1 % nasal spray USE TWO PUFFS IN EACH NOSTRIL TWO TIMES A DAY OR AS NEEDED Yes ProviderNicol MD   azelastine HCl 0.15 % SOLN by Nasal route Yes Nicol Parikh MD   levocetirizine (XYZAL) 5 MG tablet

## 2023-10-20 NOTE — PATIENT INSTRUCTIONS
bean burgers, eggplant lasagna, and veggie tofu stir-marcus. Choose high-fiber snacks, such as fruits, popcorn, whole-grain crackers, and nuts. Make whole-grain cereal or whole-grain toast part of your daily breakfast regime. When eating out, whether ordering a sandwich or dinner, ask for extra vegetables. When baking, replace part of the white flour with whole-wheat flour. Whole-wheat flour is particularly easy to incorporate into a recipe. High-Fiber Diet Eating Guide   Food Category   Foods Recommended   Notes   Grains   Whole-grain breads, muffins, bagels, or jeff bread Rye bread Whole-wheat crackers or crisp breads Whole-grain or bran cereals Oatmeal, oat bran, or grits Wheat germ Whole-wheat pasta and brown rice   Read the ingredients list on food labels. Look for products that list \"whole\" as the first ingredient (eg, whole-wheat, whole oats). Choose cereals with at least 2 grams of fiber per serving. Vegetables   All vegetables, especially asparagus, bean sprouts, broccoli, [place]man sprouts, cabbage, carrots, cauliflower, celery, corn, greens, green beans, green pepper, onions, peas, potatoes (with skin), snow peas, spinach, squash, sweet potatoes, tomatoes, zucchini   For maximum fiber intake, eat the peels of fruits and vegetablesjust be sure to wash them well first.   Fruits   All fruits, especially apples, berries, grapefruits, mangoes, nectarines, oranges, peaches, pears, dried fruits (figs, dates, prunes, raisins)   Choose raw fruits and vegetables over juice, cooked, or cannedraw fruit has more fiber. Dried fruit is also a good source of fiber. Milk   With the exception of yogurt containing inulin (a type of fiber), dairy foods provide little fiber. Add more fiber by topping your yogurt or cottage cheese with fresh fruit, whole grain or bran cereals, nuts, or seeds.    Meats and Beans   All beans and peas, especially Garbanzo beans, kidney beans, lentils, lima beans, split peas, and

## 2023-11-01 ENCOUNTER — OFFICE VISIT (OUTPATIENT)
Dept: FAMILY MEDICINE CLINIC | Age: 53
End: 2023-11-01
Payer: COMMERCIAL

## 2023-11-01 VITALS
HEART RATE: 86 BPM | BODY MASS INDEX: 27.23 KG/M2 | TEMPERATURE: 97.7 F | OXYGEN SATURATION: 96 % | WEIGHT: 148 LBS | RESPIRATION RATE: 16 BRPM | HEIGHT: 62 IN | SYSTOLIC BLOOD PRESSURE: 132 MMHG | DIASTOLIC BLOOD PRESSURE: 78 MMHG

## 2023-11-01 DIAGNOSIS — J01.90 ACUTE NON-RECURRENT SINUSITIS, UNSPECIFIED LOCATION: Primary | ICD-10-CM

## 2023-11-01 PROCEDURE — 99213 OFFICE O/P EST LOW 20 MIN: CPT | Performed by: PHYSICIAN ASSISTANT

## 2023-11-01 RX ORDER — AMOXICILLIN AND CLAVULANATE POTASSIUM 875; 125 MG/1; MG/1
1 TABLET, FILM COATED ORAL 2 TIMES DAILY
Qty: 20 TABLET | Refills: 0 | Status: SHIPPED | OUTPATIENT
Start: 2023-11-01 | End: 2023-11-11

## 2023-11-01 RX ORDER — FLUTICASONE PROPIONATE 50 MCG
2 SPRAY, SUSPENSION (ML) NASAL DAILY
Qty: 16 G | Refills: 0 | Status: SHIPPED | OUTPATIENT
Start: 2023-11-01

## 2023-11-01 ASSESSMENT — ENCOUNTER SYMPTOMS
SHORTNESS OF BREATH: 0
SORE THROAT: 0
PHOTOPHOBIA: 0
ABDOMINAL PAIN: 0
COUGH: 0
BACK PAIN: 0
VOMITING: 0
NAUSEA: 0
DIARRHEA: 0
SINUS PAIN: 1

## 2023-11-10 ENCOUNTER — OFFICE VISIT (OUTPATIENT)
Dept: SURGERY | Age: 53
End: 2023-11-10
Payer: COMMERCIAL

## 2023-11-10 VITALS
HEART RATE: 84 BPM | DIASTOLIC BLOOD PRESSURE: 73 MMHG | OXYGEN SATURATION: 98 % | WEIGHT: 151 LBS | SYSTOLIC BLOOD PRESSURE: 128 MMHG | HEIGHT: 62 IN | BODY MASS INDEX: 27.79 KG/M2

## 2023-11-10 DIAGNOSIS — R49.0 HOARSENESS OF VOICE: Primary | ICD-10-CM

## 2023-11-10 PROCEDURE — 99203 OFFICE O/P NEW LOW 30 MIN: CPT | Performed by: SURGERY

## 2023-11-10 RX ORDER — PANTOPRAZOLE SODIUM 20 MG/1
20 TABLET, DELAYED RELEASE ORAL DAILY
Qty: 30 TABLET | Refills: 3 | Status: SHIPPED | OUTPATIENT
Start: 2023-11-10

## 2023-11-10 NOTE — PROGRESS NOTES
referring provider's notes were also reviewed. Any procedures planned or discussed as above had risks, benefits, and reasonable alternatives thoroughly discussed with the patient or responsible party. NOTE: This report, in part or full, may have been transcribed using voice recognition software. Every effort was made to ensure accuracy; however, inadvertent computerized transcription errors may be present. Please excuse any transcriptional grammatical or spelling errors that may have escaped my editorial review.     CC: Jenna Milligan MD

## 2023-11-13 ENCOUNTER — TELEPHONE (OUTPATIENT)
Dept: SURGERY | Age: 53
End: 2023-11-13

## 2023-11-13 NOTE — TELEPHONE ENCOUNTER
Scheduled pt for US neck thyroid 11/17/23 at 200 with an arrival time of 130 in [facility]. Pt advised and verbalized understanding.  Electronically signed by Tanisha Goldberg MA on 11/13/2023 at 10:39 AM

## 2023-11-17 ENCOUNTER — HOSPITAL ENCOUNTER (OUTPATIENT)
Dept: ULTRASOUND IMAGING | Age: 53
Discharge: HOME | End: 2023-11-17
Attending: SURGERY
Payer: COMMERCIAL

## 2023-11-17 DIAGNOSIS — R49.0 HOARSENESS OF VOICE: ICD-10-CM

## 2023-11-17 PROCEDURE — 76536 US EXAM OF HEAD AND NECK: CPT

## 2023-11-21 ENCOUNTER — TELEPHONE (OUTPATIENT)
Dept: SURGERY | Age: 53
End: 2023-11-21

## 2023-12-01 ENCOUNTER — OFFICE VISIT (OUTPATIENT)
Dept: SURGERY | Age: 53
End: 2023-12-01
Payer: COMMERCIAL

## 2023-12-01 VITALS
HEART RATE: 83 BPM | DIASTOLIC BLOOD PRESSURE: 77 MMHG | OXYGEN SATURATION: 97 % | SYSTOLIC BLOOD PRESSURE: 112 MMHG | WEIGHT: 151 LBS | TEMPERATURE: 97.8 F | BODY MASS INDEX: 27.79 KG/M2 | HEIGHT: 62 IN

## 2023-12-01 DIAGNOSIS — R49.0 HOARSENESS OF VOICE: Primary | ICD-10-CM

## 2023-12-01 DIAGNOSIS — K57.92 DIVERTICULITIS: ICD-10-CM

## 2023-12-01 PROCEDURE — 99213 OFFICE O/P EST LOW 20 MIN: CPT | Performed by: SURGERY

## 2023-12-01 NOTE — PROGRESS NOTES
[facility] Surgery Clinic Note    Assessment/Plan:     Diagnosis Orders   1. Hoarseness of voice      Ultrasound negative. Sees ENT for vocal cord evaluation soon. Given chronic doubt any concerning etiology      2. Diverticulitis      History of colectomy in the past.  She is due for colonoscopy. We will plan for that          Return for Colonoscopy. Chief Complaint   Patient presents with    Follow-up     Follow up,  11/17/23       PCP: Enrico Lisa MD    HPI: Effie Reyes is a 48 y.o. female here for follow-up of voice hoarseness. She had ultrasound that showed no significant abnormalities per the report. She says she forgot to mention last time that she was born as a twin. [place] is about 3 [place] She says that she had \"tubes for over a month. \"  Question whether she actually had an ET tube and there may have been some vocal cord trauma from that at that point -especially given her voice has been like this her entire life she says. She also has a history of diverticulitis and resection in 2012. Her last colonoscopy was about 11 years ago. She has no problems moving her bowels. Review of Systems   All other systems reviewed and are negative. No past medical history on file. Past Surgical History:   Procedure Laterality Date    LITHOTRIPSY N/A 2021    SIGMOID COLECTOMY N/A 2015       No family history on file.     Social History     Socioeconomic History    Marital status:      Spouse name: Not on file    Number of children: Not on file    Years of education: Not on file    Highest education level: Not on file   Occupational History    Not on file   Tobacco Use    Smoking status: Never    Smokeless tobacco: Never   Substance and Sexual Activity    Alcohol use: Not on file    Drug use: Not on file    Sexual activity: Not on file   Other Topics Concern    Not on file   Social History Narrative    Not on file     Social Determinants of Health

## 2023-12-27 ENCOUNTER — PREP FOR PROCEDURE (OUTPATIENT)
Dept: SURGERY | Age: 53
End: 2023-12-27

## 2023-12-27 DIAGNOSIS — K57.92 DIVERTICULITIS: ICD-10-CM

## 2024-01-10 ENCOUNTER — TELEPHONE (OUTPATIENT)
Dept: SURGERY | Age: 54
End: 2024-01-10

## 2024-01-10 NOTE — TELEPHONE ENCOUNTER
Prior Authorization Form:      DEMOGRAPHICS:                     Patient Name:  Ragini Lindsey  Patient :  1970            Insurance:  Payor: CIGNA / Plan: CIGNA PPO / Product Type: *No Product type* /   Insurance ID Number:    Payer/Plan Subscr  Sex Relation Sub. Ins. ID Effective Group Num   1. CIGNA - CIGNA* RAGINI LINDSEY 1970 Female Spouse [id number] 10/1/17 [id number]                                    [id number]         DIAGNOSIS & PROCEDURE:                       Procedure/Operation: Colonoscopy            CPT Code: 09694    Diagnosis:  Diverticulitis    ICD10 Code: K57.92    Location:  [facility]    Surgeon:  Laura    SCHEDULING INFORMATION:                          Date: 3/27/24    Time: 8:00              Anesthesia:  LMAC                                                       Status:  Outpatient        Special Comments:         Electronically signed by Parisa Jimenez MA on 1/10/2024 at 12:56 PM

## 2024-01-10 NOTE — TELEPHONE ENCOUNTER
Ragini HARRISON Rush is scheduled for colonoscopy with Dr Sanchez on 3/27/24 at 8:00 [facility]. Patient needs to be NPO after midnight the night before procedure. All surgery instructions were explained to the patient and a surgery letter was also mailed out. MA informed patient that PAT will also be calling to review pre-op instructions and medications. Patient verbalized understanding. Electronically signed by Parisa Jimenez MA on 1/10/2024 at 12:56 PM

## 2024-02-16 ENCOUNTER — APPOINTMENT (OUTPATIENT)
Dept: CT IMAGING | Age: 54
End: 2024-02-16
Payer: COMMERCIAL

## 2024-02-16 ENCOUNTER — APPOINTMENT (OUTPATIENT)
Dept: GENERAL RADIOLOGY | Age: 54
End: 2024-02-16
Payer: COMMERCIAL

## 2024-02-16 ENCOUNTER — HOSPITAL ENCOUNTER (EMERGENCY)
Age: 54
Discharge: HOME | End: 2024-02-16
Attending: EMERGENCY MEDICINE
Payer: COMMERCIAL

## 2024-02-16 VITALS
HEART RATE: 74 BPM | RESPIRATION RATE: 18 BRPM | WEIGHT: 145 LBS | SYSTOLIC BLOOD PRESSURE: 111 MMHG | BODY MASS INDEX: 26.68 KG/M2 | TEMPERATURE: 97.3 F | OXYGEN SATURATION: 95 % | HEIGHT: 62 IN | DIASTOLIC BLOOD PRESSURE: 72 MMHG

## 2024-02-16 DIAGNOSIS — G43.909 MIGRAINE WITHOUT STATUS MIGRAINOSUS, NOT INTRACTABLE, UNSPECIFIED MIGRAINE TYPE: Primary | ICD-10-CM

## 2024-02-16 LAB
ANION GAP SERPL CALCULATED.3IONS-SCNC: 12 MMOL/L (ref 7–16)
BASOPHILS # BLD: 0.03 K/UL (ref 0–0.2)
BASOPHILS NFR BLD: 0 % (ref 0–2)
BUN SERPL-MCNC: 10 MG/DL (ref 6–20)
CALCIUM SERPL-MCNC: 9.1 MG/DL (ref 8.6–10.2)
CHLORIDE SERPL-SCNC: 101 MMOL/L (ref 98–107)
CO2 SERPL-SCNC: 21 MMOL/L (ref 22–29)
CREAT SERPL-MCNC: 0.5 MG/DL (ref 0.5–1)
D DIMER: <200 NG/ML DDU (ref 0–232)
EOSINOPHIL # BLD: 0.01 K/UL (ref 0.05–0.5)
EOSINOPHILS RELATIVE PERCENT: 0 % (ref 0–6)
ERYTHROCYTE [DISTWIDTH] IN BLOOD BY AUTOMATED COUNT: 11.9 % (ref 11.5–15)
GFR SERPL CREATININE-BSD FRML MDRD: >60 ML/MIN/1.73M2
GLUCOSE SERPL-MCNC: 96 MG/DL (ref 74–99)
HCG, URINE, POC: NEGATIVE
HCT VFR BLD AUTO: 41.8 % (ref 34–48)
HGB BLD-MCNC: 14.4 G/DL (ref 11.5–15.5)
IMM GRANULOCYTES # BLD AUTO: 0.03 K/UL (ref 0–0.58)
IMM GRANULOCYTES NFR BLD: 0 % (ref 0–5)
INFLUENZA A BY PCR: NOT DETECTED
INFLUENZA B BY PCR: NOT DETECTED
LYMPHOCYTES NFR BLD: 0.97 K/UL (ref 1.5–4)
LYMPHOCYTES RELATIVE PERCENT: 11 % (ref 20–42)
Lab: NORMAL
MAGNESIUM SERPL-MCNC: 2.1 MG/DL (ref 1.6–2.6)
MCH RBC QN AUTO: 31.6 PG (ref 26–35)
MCHC RBC AUTO-ENTMCNC: 34.4 G/DL (ref 32–34.5)
MCV RBC AUTO: 91.7 FL (ref 80–99.9)
MONOCYTES NFR BLD: 0.45 K/UL (ref 0.1–0.95)
MONOCYTES NFR BLD: 5 % (ref 2–12)
NEGATIVE QC PASS/FAIL: NORMAL
NEUTROPHILS NFR BLD: 83 % (ref 43–80)
NEUTS SEG NFR BLD: 7.12 K/UL (ref 1.8–7.3)
PLATELET CONFIRMATION: NORMAL
PLATELET, FLUORESCENCE: 437 K/UL (ref 130–450)
PMV BLD AUTO: 9.7 FL (ref 7–12)
POSITIVE QC PASS/FAIL: NORMAL
POTASSIUM SERPL-SCNC: 4 MMOL/L (ref 3.5–5)
RBC # BLD AUTO: 4.56 M/UL (ref 3.5–5.5)
RSV BY PCR: NOT DETECTED
SARS-COV-2 RDRP RESP QL NAA+PROBE: NOT DETECTED
SODIUM SERPL-SCNC: 134 MMOL/L (ref 132–146)
SPECIMEN DESCRIPTION: NORMAL
SPECIMEN SOURCE: NORMAL
TROPONIN I SERPL HS-MCNC: <6 NG/L (ref 0–9)
WBC OTHER # BLD: 8.6 K/UL (ref 4.5–11.5)

## 2024-02-16 PROCEDURE — 2580000003 HC RX 258

## 2024-02-16 PROCEDURE — 80048 BASIC METABOLIC PNL TOTAL CA: CPT

## 2024-02-16 PROCEDURE — 96374 THER/PROPH/DIAG INJ IV PUSH: CPT

## 2024-02-16 PROCEDURE — 87502 INFLUENZA DNA AMP PROBE: CPT

## 2024-02-16 PROCEDURE — 99285 EMERGENCY DEPT VISIT HI MDM: CPT

## 2024-02-16 PROCEDURE — 70450 CT HEAD/BRAIN W/O DYE: CPT

## 2024-02-16 PROCEDURE — 6360000002 HC RX W HCPCS

## 2024-02-16 PROCEDURE — 87635 SARS-COV-2 COVID-19 AMP PRB: CPT

## 2024-02-16 PROCEDURE — 87634 RSV DNA/RNA AMP PROBE: CPT

## 2024-02-16 PROCEDURE — 85025 COMPLETE CBC W/AUTO DIFF WBC: CPT

## 2024-02-16 PROCEDURE — 84484 ASSAY OF TROPONIN QUANT: CPT

## 2024-02-16 PROCEDURE — 83735 ASSAY OF MAGNESIUM: CPT

## 2024-02-16 PROCEDURE — 96375 TX/PRO/DX INJ NEW DRUG ADDON: CPT

## 2024-02-16 PROCEDURE — 71045 X-RAY EXAM CHEST 1 VIEW: CPT

## 2024-02-16 PROCEDURE — 85379 FIBRIN DEGRADATION QUANT: CPT

## 2024-02-16 RX ORDER — DIPHENHYDRAMINE HYDROCHLORIDE 50 MG/ML
25 INJECTION INTRAMUSCULAR; INTRAVENOUS ONCE
Status: COMPLETED | OUTPATIENT
Start: 2024-02-16 | End: 2024-02-16

## 2024-02-16 RX ORDER — METOCLOPRAMIDE HYDROCHLORIDE 5 MG/ML
10 INJECTION INTRAMUSCULAR; INTRAVENOUS ONCE
Status: COMPLETED | OUTPATIENT
Start: 2024-02-16 | End: 2024-02-16

## 2024-02-16 RX ORDER — SODIUM CHLORIDE 9 MG/ML
INJECTION, SOLUTION INTRAVENOUS ONCE
Status: COMPLETED | OUTPATIENT
Start: 2024-02-16 | End: 2024-02-16

## 2024-02-16 RX ADMIN — SODIUM CHLORIDE: 9 INJECTION, SOLUTION INTRAVENOUS at 16:13

## 2024-02-16 RX ADMIN — DIPHENHYDRAMINE HYDROCHLORIDE 25 MG: 50 INJECTION INTRAMUSCULAR; INTRAVENOUS at 16:15

## 2024-02-16 RX ADMIN — METOCLOPRAMIDE 10 MG: 5 INJECTION, SOLUTION INTRAMUSCULAR; INTRAVENOUS at 16:26

## 2024-02-16 NOTE — ED NOTES
Department of Emergency Medicine    FIRST PROVIDER TRIAGE NOTE             Independent MLP           2/16/24  1:44 PM EST    Date of Encounter: 2/16/24   MRN: [id number]    Vitals:    02/16/24 1343   BP: (!) 164/91   Pulse: (!) 102   Resp: 16   Temp: 97.3 °F (36.3 °C)   TempSrc: Oral   SpO2: 100%   Weight: 65.8 kg (145 lb)   Height: 1.575 m (5' 2\")        HPI: Ragini Rush is a 53 y.o. female who presents to the ED for Tingling (Feels like she was \"having heart attack\"), Fatigue, Dizziness, and Nausea         ROS: Negative for fever.    Physical Exam:   Gen Appearance/Constitutional: alert  CV: regular rate  Pulm: Respirations easy and unlabored  Musculoskeletal: moves all extremities x 4     Initial Plan of Care: All treatment areas with department are currently occupied.     Plan to order/Initiate the following while awaiting opening in ED: Triage evaluation,  EKG      EKG completed by nursing staff and interpreted by emergency department physician. .    Initial Plan of Care: Initiate Treatment-Testing, Proceed toTreatment Area When Bed Available for ED Attending/MLP to Continue Care    Electronically signed by AYAN Love CNP   DD: 2/16/24       Aleshia Mabry APRN - CNP  02/16/24 1393

## 2024-02-16 NOTE — ED PROVIDER NOTES
[facility] EMERGENCY DEPARTMENT  EMERGENCY DEPARTMENT ENCOUNTER        Pt Name: Ragini Rush  MRN: [id number]  Birthdate 1970  Date of evaluation: 2/16/2024  Provider: Lorne Nguyen MD  PCP: Hugo Baez MD  Note Started: 3:22 PM EST 2/16/24    CHIEF COMPLAINT       Chief Complaint   Patient presents with    Tingling     Feels like she was \"having heart attack\"    Fatigue    Dizziness    Nausea       HISTORY OF PRESENT ILLNESS: 1 or more Elements   History From: Patient    Limitations to history : None  Social Determinants : None    Ragini Rush is a 53 y.o. female past medical history of diverticulitis, tonsillitis who presents to the emergency department with complaints of fatigue, dizziness, nausea, tingling in her chest. Patient states that she started presenting with symptoms on Wednesday when she was working at [employer].  Patient states that she has had a very high stress job.  She states that she presented with a headache, some dizziness.  Patient states that she took some over-the-counter pain medication presents with chronic migraine went to a dark room.  Patient states that yesterday she felt better with no symptoms but symptoms to the started earlier today.  Patient stated that she does get dizzy when she stands up from a sitting position, denies any recent falls or loss of consciousness.  Patient does not follow-up with cardiology.  Patient denies any fever, chills, vision changes, neck stiffness, abdominal pain, chest pain, diarrhea, constipation, bloody stools.    Nursing Notes were all reviewed and agreed with or any disagreements were addressed in the HPI.    ROS:   Pertinent positives and negatives are stated within HPI, all other systems reviewed and are negative.      --------------------------------------------- PAST HISTORY ---------------------------------------------  Past Medical History:  has no past medical history on

## 2024-02-16 NOTE — DISCHARGE INSTRUCTIONS
Follow-up with your primary care doctor.  Return to the emergency department if your symptoms worsen or persist.

## 2024-02-18 LAB
EKG ATRIAL RATE: 113 BPM
EKG P AXIS: 60 DEGREES
EKG P-R INTERVAL: 144 MS
EKG Q-T INTERVAL: 344 MS
EKG QRS DURATION: 78 MS
EKG QTC CALCULATION (BAZETT): 471 MS
EKG R AXIS: 19 DEGREES
EKG T AXIS: 35 DEGREES
EKG VENTRICULAR RATE: 113 BPM

## 2024-03-22 RX ORDER — SODIUM CHLORIDE/ALOE VERA
GEL (GRAM) NASAL PRN
COMMUNITY
End: 2024-04-12

## 2024-03-22 NOTE — PROGRESS NOTES
[facility] PRE-ADMISSION TESTING INSTRUCTIONS    The Preadmission Testing patient is instructed accordingly using the following criteria (check applicable):    ARRIVAL INSTRUCTIONS:  [x] Parking the day of Surgery is located in the Main Entrance lot.  Upon entering the door, make an immediate right to the surgery reception desk    [x] Bring photo ID and insurance card    [] Bring in a copy of Living will or Durable Power of  papers.    [x] Please be sure to arrange for responsible adult to provide transportation to and from the hospital    [x] Please arrange for responsible adult to be with you for the 24 hour period post procedure due to having anesthesia    [x] If you awake am of surgery not feeling well or have temperature >100 please call [phone]    GENERAL INSTRUCTIONS:    [x] NPO AFTER MIDNIGHT       No gum, candy or mints.    [x] You may brush your teeth, but do not swallow any water    [x] Take medications as instructed with 1-2 oz of water    [x] Stop herbal supplements and vitamins 5 days prior to procedure    [x] Follow preop dosing of blood thinners per physician instructions    [] Take 1/2 dose of evening insulin, but no insulin after midnight    [] No oral diabetic medications after midnight    [] If diabetic and have low blood sugar or feel symptomatic, take 1-2oz apple juice only    [] Bring inhalers day of surgery    [] Bring C-PAP/ Bi-Pap day of surgery    [x] Bring urine specimen day of surgery    [x] Shower or bath with soap, lather and rinse well, AM of Surgery, no lotion, powders or creams to surgical site    [x] Follow bowel prep as instructed per surgeon    [x] No tobacco products within 24 hours of surgery     [x] No alcohol or illegal drug use within 24 hours of surgery.    [x] Jewelry, body piercing's, eyeglasses, contact lenses and dentures are not permitted into surgery (bring cases)      [] Please do not wear any nail polish, make up or hair

## 2024-03-27 ENCOUNTER — ANESTHESIA (OUTPATIENT)
Dept: ENDOSCOPY | Age: 54
End: 2024-03-27
Payer: COMMERCIAL

## 2024-03-27 ENCOUNTER — ANESTHESIA EVENT (OUTPATIENT)
Dept: ENDOSCOPY | Age: 54
End: 2024-03-27
Payer: COMMERCIAL

## 2024-03-27 ENCOUNTER — HOSPITAL ENCOUNTER (OUTPATIENT)
Age: 54
Setting detail: OUTPATIENT SURGERY
Discharge: HOME | End: 2024-03-27
Attending: SURGERY | Admitting: SURGERY
Payer: COMMERCIAL

## 2024-03-27 VITALS
HEIGHT: 62 IN | SYSTOLIC BLOOD PRESSURE: 106 MMHG | DIASTOLIC BLOOD PRESSURE: 64 MMHG | OXYGEN SATURATION: 97 % | WEIGHT: 148 LBS | RESPIRATION RATE: 18 BRPM | TEMPERATURE: 97.8 F | HEART RATE: 87 BPM | BODY MASS INDEX: 27.23 KG/M2

## 2024-03-27 DIAGNOSIS — K57.92 DIVERTICULITIS: ICD-10-CM

## 2024-03-27 LAB
HCG, URINE, POC: NEGATIVE
Lab: NORMAL
NEGATIVE QC PASS/FAIL: NORMAL
POSITIVE QC PASS/FAIL: NORMAL

## 2024-03-27 PROCEDURE — 45385 COLONOSCOPY W/LESION REMOVAL: CPT | Performed by: SURGERY

## 2024-03-27 PROCEDURE — 7100000010 HC PHASE II RECOVERY - FIRST 15 MIN: Performed by: SURGERY

## 2024-03-27 PROCEDURE — 88305 TISSUE EXAM BY PATHOLOGIST: CPT

## 2024-03-27 PROCEDURE — 7100000011 HC PHASE II RECOVERY - ADDTL 15 MIN: Performed by: SURGERY

## 2024-03-27 PROCEDURE — 3700000000 HC ANESTHESIA ATTENDED CARE: Performed by: SURGERY

## 2024-03-27 PROCEDURE — 2709999900 HC NON-CHARGEABLE SUPPLY: Performed by: SURGERY

## 2024-03-27 PROCEDURE — 6360000002 HC RX W HCPCS: Performed by: NURSE ANESTHETIST, CERTIFIED REGISTERED

## 2024-03-27 PROCEDURE — 3700000001 HC ADD 15 MINUTES (ANESTHESIA): Performed by: SURGERY

## 2024-03-27 PROCEDURE — 2580000003 HC RX 258: Performed by: NURSE ANESTHETIST, CERTIFIED REGISTERED

## 2024-03-27 PROCEDURE — 3609010600 HC COLONOSCOPY POLYPECTOMY SNARE/COLD BIOPSY: Performed by: SURGERY

## 2024-03-27 RX ORDER — PROPOFOL 10 MG/ML
INJECTION, EMULSION INTRAVENOUS PRN
Status: DISCONTINUED | OUTPATIENT
Start: 2024-03-27 | End: 2024-03-27 | Stop reason: SDUPTHER

## 2024-03-27 RX ORDER — SODIUM CHLORIDE 9 MG/ML
INJECTION, SOLUTION INTRAVENOUS CONTINUOUS PRN
Status: DISCONTINUED | OUTPATIENT
Start: 2024-03-27 | End: 2024-03-27 | Stop reason: SDUPTHER

## 2024-03-27 RX ADMIN — SODIUM CHLORIDE: 9 INJECTION, SOLUTION INTRAVENOUS at 07:47

## 2024-03-27 RX ADMIN — PROPOFOL 300 MG: 10 INJECTION, EMULSION INTRAVENOUS at 08:08

## 2024-03-27 ASSESSMENT — PAIN - FUNCTIONAL ASSESSMENT: PAIN_FUNCTIONAL_ASSESSMENT: NONE - DENIES PAIN

## 2024-03-27 ASSESSMENT — PAIN SCALES - GENERAL: PAINLEVEL_OUTOF10: 0

## 2024-03-27 ASSESSMENT — LIFESTYLE VARIABLES: SMOKING_STATUS: 0

## 2024-03-27 NOTE — ANESTHESIA POSTPROCEDURE EVALUATION
Department of Anesthesiology  Postprocedure Note    Patient: Ragini Rush  MRN: [id number]  YOB: 1970  Date of evaluation: 3/27/2024    Procedure Summary       Date: 03/27/24 Room / Location: [place] [address] [facility]    Anesthesia Start: 0802 Anesthesia Stop: 0826    Procedure: COLONOSCOPY POLYPECTOMY SNARE/COLD BIOPSY Diagnosis:       Diverticulitis      (Diverticulitis [K57.92])    Surgeons: Chet Sanchez MD Responsible Provider: Rafael Dodd Jr., MD    Anesthesia Type: MAC ASA Status: 2            Anesthesia Type: No value filed.    Brianna Phase I: Brianna Score: 10    Brianna Phase II:      Anesthesia Post Evaluation    Patient location during evaluation: PACU  Patient participation: complete - patient participated  Level of consciousness: awake  Airway patency: patent  Nausea & Vomiting: no nausea and no vomiting  Cardiovascular status: hemodynamically stable  Respiratory status: acceptable  Hydration status: euvolemic  Pain management: adequate        No notable events documented.

## 2024-03-27 NOTE — ANESTHESIA PRE PROCEDURE
\"POCHCT\" in the last 72 hours.    Coags: No results found for: \"PROTIME\", \"INR\", \"APTT\"    HCG (If Applicable): No results found for: \"PREGTESTUR\", \"PREGSERUM\", \"HCG\", \"HCGQUANT\"     ABGs: No results found for: \"PHART\", \"PO2ART\", \"EIF9MIF\", \"GYY8BEE\", \"BEART\", \"Y6QRYKKC\"     Type & Screen (If Applicable):  No results found for: \"LABABO\", \"LABRH\"    Drug/Infectious Status (If Applicable):  No results found for: \"HIV\", \"HEPCAB\"    COVID-19 Screening (If Applicable):   Lab Results   Component Value Date/Time    COVID19 Not Detected 02/16/2024 04:02 PM           Anesthesia Evaluation  Patient summary reviewed and Nursing notes reviewed   no history of anesthetic complications:   Airway: Mallampati: II  TM distance: >3 FB   Neck ROM: full  Mouth opening: > = 3 FB   Dental:    (+) bridge      Pulmonary: breath sounds clear to auscultation      (-) not a current smoker                          ROS comment: Nasal congestion   Cardiovascular:Negative CV ROS  Exercise tolerance: good (>4 METS)          Rhythm: regular  Rate: normal           Beta Blocker:  Not on Beta Blocker         Neuro/Psych:   Negative Neuro/Psych ROS              GI/Hepatic/Renal:   (+) bowel prep          Endo/Other: Negative Endo/Other ROS                    Abdominal:             Vascular: negative vascular ROS.         Other Findings:       Anesthesia Plan      MAC     ASA 2       Induction: intravenous.      Anesthetic plan and risks discussed with patient and spouse.                    Rafael Dodd Jr, MD   3/27/2024

## 2024-03-28 ENCOUNTER — OFFICE VISIT (OUTPATIENT)
Dept: FAMILY MEDICINE CLINIC | Age: 54
End: 2024-03-28
Payer: COMMERCIAL

## 2024-03-28 VITALS
OXYGEN SATURATION: 96 % | WEIGHT: 148 LBS | SYSTOLIC BLOOD PRESSURE: 112 MMHG | TEMPERATURE: 97.7 F | DIASTOLIC BLOOD PRESSURE: 66 MMHG | RESPIRATION RATE: 18 BRPM | BODY MASS INDEX: 27.23 KG/M2 | HEIGHT: 62 IN | HEART RATE: 78 BPM

## 2024-03-28 DIAGNOSIS — J40 SINOBRONCHITIS: Primary | ICD-10-CM

## 2024-03-28 DIAGNOSIS — H69.93 ETD (EUSTACHIAN TUBE DYSFUNCTION), BILATERAL: ICD-10-CM

## 2024-03-28 DIAGNOSIS — J32.9 SINOBRONCHITIS: Primary | ICD-10-CM

## 2024-03-28 DIAGNOSIS — R05.3 COUGH, PERSISTENT: ICD-10-CM

## 2024-03-28 DIAGNOSIS — R42 DIZZY: ICD-10-CM

## 2024-03-28 PROCEDURE — 99213 OFFICE O/P EST LOW 20 MIN: CPT | Performed by: EMERGENCY MEDICINE

## 2024-03-28 RX ORDER — AMOXICILLIN AND CLAVULANATE POTASSIUM 875; 125 MG/1; MG/1
1 TABLET, FILM COATED ORAL 2 TIMES DAILY
Qty: 20 TABLET | Refills: 0 | Status: SHIPPED | OUTPATIENT
Start: 2024-03-28 | End: 2024-04-07

## 2024-03-28 RX ORDER — BROMPHENIRAMINE MALEATE, PSEUDOEPHEDRINE HYDROCHLORIDE, AND DEXTROMETHORPHAN HYDROBROMIDE 2; 30; 10 MG/5ML; MG/5ML; MG/5ML
5 SYRUP ORAL 4 TIMES DAILY PRN
Qty: 180 ML | Refills: 0 | Status: SHIPPED | OUTPATIENT
Start: 2024-03-28

## 2024-03-28 RX ORDER — PREDNISONE 20 MG/1
20 TABLET ORAL 2 TIMES DAILY
Qty: 10 TABLET | Refills: 0 | Status: SHIPPED | OUTPATIENT
Start: 2024-03-28 | End: 2024-04-02

## 2024-03-28 ASSESSMENT — ENCOUNTER SYMPTOMS
SINUS PRESSURE: 1
EYE DISCHARGE: 0
SORE THROAT: 0
SHORTNESS OF BREATH: 0
VOMITING: 0
BACK PAIN: 0
EYE REDNESS: 0
COUGH: 1
NAUSEA: 0
WHEEZING: 0
DIARRHEA: 0
EYE PAIN: 0
ABDOMINAL DISTENTION: 0

## 2024-03-28 NOTE — PROGRESS NOTES
chills and fever.   HENT:  Positive for congestion, ear pain and sinus pressure. Negative for sore throat.    Eyes:  Negative for pain, discharge and redness.   Respiratory:  Positive for cough. Negative for shortness of breath and wheezing.    Cardiovascular:  Negative for chest pain.   Gastrointestinal:  Negative for abdominal distention, diarrhea, nausea and vomiting.   Genitourinary:  Negative for dysuria and frequency.   Musculoskeletal:  Negative for arthralgias and back pain.   Skin:  Negative for rash and wound.   Neurological:  Positive for dizziness. Negative for weakness and headaches.   Hematological:  Negative for adenopathy.   Psychiatric/Behavioral: Negative.     All other systems reviewed and are negative.      Patient's medical, social, and family history reviewed    Past Medical History:  has no past medical history on file.   Past Surgical History:  has a past surgical history that includes Sigmoid Colectomy (N/A, 2015); Lithotripsy (N/A, 2021); and Colonoscopy (N/A, 3/27/2024).  Social History:  reports that she has never smoked. She has never used smokeless tobacco. She reports current drug use.  Family History: family history is not on file.  Allergies: Patient has no known allergies.    Physical Exam   Vital Signs:  /66   Pulse 78   Temp 97.7 °F (36.5 °C)   Resp 18   Ht 1.575 m (5' 2\")   Wt 67.1 kg (148 lb)   SpO2 96%   BMI 27.07 kg/m²    Oxygen Saturation Interpretation: Normal.    Physical Exam  Vitals and nursing note reviewed.   Constitutional:       Appearance: She is well-developed.   HENT:      Head: Normocephalic and atraumatic.      Right Ear: Hearing and external ear normal. A middle ear effusion is present.      Left Ear: Hearing and external ear normal. A middle ear effusion is present.      Nose: Congestion and rhinorrhea present.      Mouth/Throat:      Pharynx: Uvula midline. Posterior oropharyngeal erythema present.   Eyes:      General: Lids are normal.

## 2024-03-29 LAB — SURGICAL PATHOLOGY REPORT: NORMAL

## 2024-03-31 NOTE — TELEPHONE ENCOUNTER
Called and spoke with the patient, she is going to be going to [facility] today when her  finishes cleaning the drive. Moderate Variability

## 2024-04-12 ENCOUNTER — OFFICE VISIT (OUTPATIENT)
Dept: SURGERY | Age: 54
End: 2024-04-12
Payer: COMMERCIAL

## 2024-04-12 VITALS
DIASTOLIC BLOOD PRESSURE: 69 MMHG | OXYGEN SATURATION: 97 % | TEMPERATURE: 97.5 F | WEIGHT: 155 LBS | HEART RATE: 93 BPM | SYSTOLIC BLOOD PRESSURE: 124 MMHG | BODY MASS INDEX: 28.52 KG/M2 | HEIGHT: 62 IN

## 2024-04-12 DIAGNOSIS — R49.0 HOARSENESS OF VOICE: ICD-10-CM

## 2024-04-12 DIAGNOSIS — K21.9 GASTROESOPHAGEAL REFLUX DISEASE, UNSPECIFIED WHETHER ESOPHAGITIS PRESENT: ICD-10-CM

## 2024-04-12 DIAGNOSIS — K57.30 DIVERTICULOSIS OF LARGE INTESTINE WITHOUT HEMORRHAGE: Primary | ICD-10-CM

## 2024-04-12 PROCEDURE — 99213 OFFICE O/P EST LOW 20 MIN: CPT | Performed by: SURGERY

## 2024-04-12 NOTE — PROGRESS NOTES
\"INR\"  HgBA1c:  No results found for: \"LABA1C\"  LIPASE:  No results found for: \"LIPASE\"       Chet Sanchez MD      I have examined the patient and performed the key aspects of the physical exam,and reviewed the record (including laboratory findings, results, and all pertinent radiology images, which are independently reviewed and interpreted unless otherwise explicitly stated). The referring provider's notes were also reviewed.    Any procedures planned or discussed as above had risks, benefits, and reasonable alternatives thoroughly discussed with the patient or responsible party.     NOTE: This report, in part or full, may have been transcribed using voice recognition software. Every effort was made to ensure accuracy; however, inadvertent computerized transcription errors may be present. Please excuse any transcriptional grammatical or spelling errors that may have escaped my editorial review.      CC: Hugo Baez MD

## 2024-08-14 ENCOUNTER — OFFICE VISIT (OUTPATIENT)
Dept: FAMILY MEDICINE CLINIC | Age: 54
End: 2024-08-14
Payer: COMMERCIAL

## 2024-08-14 VITALS
SYSTOLIC BLOOD PRESSURE: 124 MMHG | OXYGEN SATURATION: 97 % | TEMPERATURE: 98.3 F | DIASTOLIC BLOOD PRESSURE: 68 MMHG | HEART RATE: 107 BPM | WEIGHT: 155 LBS | HEIGHT: 62 IN | BODY MASS INDEX: 28.52 KG/M2 | RESPIRATION RATE: 18 BRPM

## 2024-08-14 DIAGNOSIS — U07.1 COVID-19: Primary | ICD-10-CM

## 2024-08-14 DIAGNOSIS — R09.81 SINUS CONGESTION: ICD-10-CM

## 2024-08-14 DIAGNOSIS — J01.00 ACUTE NON-RECURRENT MAXILLARY SINUSITIS: ICD-10-CM

## 2024-08-14 DIAGNOSIS — R05.1 ACUTE COUGH: ICD-10-CM

## 2024-08-14 LAB
Lab: ABNORMAL
PERFORMING INSTRUMENT: ABNORMAL
QC PASS/FAIL: ABNORMAL
SARS-COV-2, POC: DETECTED

## 2024-08-14 PROCEDURE — 99213 OFFICE O/P EST LOW 20 MIN: CPT | Performed by: NURSE PRACTITIONER

## 2024-08-14 PROCEDURE — 87426 SARSCOV CORONAVIRUS AG IA: CPT | Performed by: NURSE PRACTITIONER

## 2024-08-14 RX ORDER — BROMPHENIRAMINE MALEATE, PSEUDOEPHEDRINE HYDROCHLORIDE, AND DEXTROMETHORPHAN HYDROBROMIDE 2; 30; 10 MG/5ML; MG/5ML; MG/5ML
SYRUP ORAL
Qty: 180 ML | Refills: 0 | Status: SHIPPED | OUTPATIENT
Start: 2024-08-14

## 2024-08-14 RX ORDER — AMOXICILLIN AND CLAVULANATE POTASSIUM 875; 125 MG/1; MG/1
1 TABLET, FILM COATED ORAL 2 TIMES DAILY
Qty: 20 TABLET | Refills: 0 | Status: SHIPPED | OUTPATIENT
Start: 2024-08-14 | End: 2024-08-24

## 2024-08-14 NOTE — PROGRESS NOTES
24  Ragini Rush : 1970 Sex: female  Age 53 y.o.    Subjective:  Chief Complaint   Patient presents with    Cough    Otalgia       HPI:   Ragini Rush , 53 y.o. female presents to the clinic for evaluation of sinus congestion x 2 weeks. The patient also reports cough, headache, sore throat, ear pain (L>R) x 2 days. The patient has taken Allegra-D for symptoms. The patient reports possible ill exposure. The patient denies rash, and fever. The patient also denies chest pain, abdominal pain, shortness of breath, wheezing, and nausea / vomiting / diarrhea.    ROS:   Unless otherwise stated in this report the patient's positive and negative responses for review of systems for constitutional, eyes, ENT, cardiovascular, respiratory, gastrointestinal, neurological, , musculoskeletal, and integument systems and related systems to the presenting problem are either stated in the history of present illness or were not pertinent or were negative for the symptoms and/or complaints related to the presenting medical problem.  Positives and pertinent negatives as per HPI.  All others reviewed and are negative.      PMH:   History reviewed. No pertinent past medical history.    Past Surgical History:   Procedure Laterality Date    COLONOSCOPY N/A 3/27/2024    COLONOSCOPY POLYPECTOMY SNARE/COLD BIOPSY performed by Chet Sanchez MD at [facility] ENDOSCOPY    LITHOTRIPSY N/A     SIGMOID COLECTOMY N/A        History reviewed. No pertinent family history.    Medications:     Current Outpatient Medications:     amoxicillin-clavulanate (AUGMENTIN) 875-125 MG per tablet, Take 1 tablet by mouth 2 times daily for 10 days, Disp: 20 tablet, Rfl: 0    brompheniramine-pseudoephedrine-DM 2-30-10 MG/5ML syrup, 5 - 10 mL by mouth every 6 hours as needed for cough / congestion., Disp: 180 mL, Rfl: 0    montelukast (SINGULAIR) 10 MG tablet, TAKE ONE TABLET BY MOUTH EVERY DAY, Disp: 30 tablet, Rfl: 11    NONFORMULARY, 1 mg

## 2024-10-04 ENCOUNTER — OFFICE VISIT (OUTPATIENT)
Dept: FAMILY MEDICINE CLINIC | Age: 54
End: 2024-10-04
Payer: COMMERCIAL

## 2024-10-04 VITALS
HEIGHT: 62 IN | RESPIRATION RATE: 18 BRPM | SYSTOLIC BLOOD PRESSURE: 136 MMHG | TEMPERATURE: 98.1 F | OXYGEN SATURATION: 99 % | DIASTOLIC BLOOD PRESSURE: 82 MMHG | BODY MASS INDEX: 28.52 KG/M2 | WEIGHT: 155 LBS | HEART RATE: 96 BPM

## 2024-10-04 DIAGNOSIS — J02.9 SORE THROAT: ICD-10-CM

## 2024-10-04 DIAGNOSIS — J01.90 ACUTE NON-RECURRENT SINUSITIS, UNSPECIFIED LOCATION: Primary | ICD-10-CM

## 2024-10-04 DIAGNOSIS — R09.81 SINUS CONGESTION: ICD-10-CM

## 2024-10-04 LAB
Lab: NORMAL
PERFORMING INSTRUMENT: NORMAL
QC PASS/FAIL: NORMAL
S PYO AG THROAT QL: NORMAL
SARS-COV-2, POC: NORMAL

## 2024-10-04 PROCEDURE — 99213 OFFICE O/P EST LOW 20 MIN: CPT | Performed by: NURSE PRACTITIONER

## 2024-10-04 PROCEDURE — 87426 SARSCOV CORONAVIRUS AG IA: CPT | Performed by: NURSE PRACTITIONER

## 2024-10-04 PROCEDURE — 87880 STREP A ASSAY W/OPTIC: CPT | Performed by: NURSE PRACTITIONER

## 2024-10-04 RX ORDER — AZITHROMYCIN 250 MG/1
TABLET, FILM COATED ORAL
Qty: 6 TABLET | Refills: 0 | Status: SHIPPED | OUTPATIENT
Start: 2024-10-04

## 2024-10-04 NOTE — PROGRESS NOTES
Pupils: Pupils are equal, round, and reactive to light.   Cardiovascular:      Rate and Rhythm: Normal rate and regular rhythm.      Pulses: Normal pulses.      Heart sounds: Normal heart sounds.   Pulmonary:      Effort: Pulmonary effort is normal.      Breath sounds: Normal breath sounds. No wheezing, rhonchi or rales.   Abdominal:      General: Bowel sounds are normal.      Palpations: Abdomen is soft.   Musculoskeletal:         General: Normal range of motion.      Cervical back: Normal range of motion and neck supple.   Lymphadenopathy:      Cervical: No cervical adenopathy.   Skin:     General: Skin is warm and dry.      Capillary Refill: Capillary refill takes less than 2 seconds.   Neurological:      General: No focal deficit present.      Mental Status: She is alert and oriented to person, place, and time.   Psychiatric:         Mood and Affect: Mood normal.         Behavior: Behavior normal.          Testing:   (All laboratory and radiology results have been personally reviewed by myself)  Labs:  Results for orders placed or performed in visit on 10/04/24   POCT rapid strep A   Result Value Ref Range    Strep A Ag None Detected None Detected   POCT COVID-19, Antigen   Result Value Ref Range    SARS-COV-2, POC Not-Detected Not Detected    Lot Number [id number]     QC Pass/Fail pass     Performing Instrument BD Veritor        Imaging:  All Radiology results interpreted by Radiologist unless otherwise noted.  No orders to display       Assessment / Plan:   The patient's vitals, allergies, medications, and past medical history have been reviewed.  Ragini was seen today for congestion and otalgia.    Diagnoses and all orders for this visit:    Acute non-recurrent sinusitis, unspecified location  -     azithromycin (ZITHROMAX Z-NANCY) 250 MG tablet; Take 2 tabs on day one, then 1 tab daily for the next 4 days    Sinus congestion  -     POCT COVID-19, Antigen    Sore throat  -     POCT rapid strep A  -     POCT

## 2024-10-21 ENCOUNTER — OFFICE VISIT (OUTPATIENT)
Dept: FAMILY MEDICINE CLINIC | Age: 54
End: 2024-10-21
Payer: COMMERCIAL

## 2024-10-21 VITALS
DIASTOLIC BLOOD PRESSURE: 60 MMHG | HEIGHT: 62 IN | WEIGHT: 155 LBS | SYSTOLIC BLOOD PRESSURE: 110 MMHG | OXYGEN SATURATION: 95 % | BODY MASS INDEX: 28.52 KG/M2 | RESPIRATION RATE: 16 BRPM | HEART RATE: 89 BPM | TEMPERATURE: 97.4 F

## 2024-10-21 DIAGNOSIS — N39.0 URINARY TRACT INFECTION WITH HEMATURIA, SITE UNSPECIFIED: Primary | ICD-10-CM

## 2024-10-21 DIAGNOSIS — R39.9 UTI SYMPTOMS: ICD-10-CM

## 2024-10-21 DIAGNOSIS — R31.9 URINARY TRACT INFECTION WITH HEMATURIA, SITE UNSPECIFIED: Primary | ICD-10-CM

## 2024-10-21 LAB
BILIRUBIN, POC: NEGATIVE
BLOOD URINE, POC: ABNORMAL
CLARITY, POC: CLEAR
COLOR, POC: ABNORMAL
GLUCOSE URINE, POC: 100 MG/DL
KETONES, POC: ABNORMAL MG/DL
LEUKOCYTE EST, POC: ABNORMAL
NITRITE, POC: POSITIVE
PH, POC: 5
PROTEIN, POC: 30 MG/DL
SPECIFIC GRAVITY, POC: 1.01
UROBILINOGEN, POC: 0.2 MG/DL

## 2024-10-21 PROCEDURE — 81002 URINALYSIS NONAUTO W/O SCOPE: CPT | Performed by: NURSE PRACTITIONER

## 2024-10-21 PROCEDURE — 99213 OFFICE O/P EST LOW 20 MIN: CPT | Performed by: NURSE PRACTITIONER

## 2024-10-21 RX ORDER — SULFAMETHOXAZOLE AND TRIMETHOPRIM 800; 160 MG/1; MG/1
1 TABLET ORAL 2 TIMES DAILY
Qty: 14 TABLET | Refills: 0 | Status: SHIPPED | OUTPATIENT
Start: 2024-10-21 | End: 2024-10-28

## 2024-10-21 RX ORDER — PROGESTERONE 100 MG/1
100 CAPSULE ORAL NIGHTLY
COMMUNITY
Start: 2024-09-18

## 2024-10-21 NOTE — PROGRESS NOTES
Urine        - Disposition: Home    - Educational material printed for patient's review and were included in patient instructions. After Visit Summary was given to patient at the end of visit.    - Patient is advised to rest and increase oral fluids. Discussed other symptomatic treatments with the patient today. The patient is to follow-up with PCP in the next 2-3 days for reevaluation. Red flag symptoms were also discussed with the patient today. If symptoms worsen the patient is to go directly to the emergency department for reevaluation and treatment. Pt verbalizes understanding and is in agreement with plan of care. All questions answered.    SIGNATURE: AYAN Castañeda-CNP    *NOTE: This report was transcribed using voice recognition software. Every effort was made to ensure accuracy; however, inadvertent computerized transcription errors may be present.

## 2024-10-23 LAB
CULTURE: NORMAL
CULTURE: NORMAL
SPECIMEN DESCRIPTION: NORMAL

## 2024-10-23 ASSESSMENT — PATIENT HEALTH QUESTIONNAIRE - PHQ9
SUM OF ALL RESPONSES TO PHQ9 QUESTIONS 1 & 2: 0
2. FEELING DOWN, DEPRESSED OR HOPELESS: NOT AT ALL
2. FEELING DOWN, DEPRESSED OR HOPELESS: NOT AT ALL
SUM OF ALL RESPONSES TO PHQ QUESTIONS 1-9: 0
1. LITTLE INTEREST OR PLEASURE IN DOING THINGS: NOT AT ALL
SUM OF ALL RESPONSES TO PHQ9 QUESTIONS 1 & 2: 0
SUM OF ALL RESPONSES TO PHQ QUESTIONS 1-9: 0
1. LITTLE INTEREST OR PLEASURE IN DOING THINGS: NOT AT ALL

## 2024-10-24 ENCOUNTER — OFFICE VISIT (OUTPATIENT)
Dept: FAMILY MEDICINE CLINIC | Age: 54
End: 2024-10-24
Payer: COMMERCIAL

## 2024-10-24 VITALS
HEART RATE: 101 BPM | BODY MASS INDEX: 27.97 KG/M2 | WEIGHT: 152 LBS | SYSTOLIC BLOOD PRESSURE: 136 MMHG | HEIGHT: 62 IN | DIASTOLIC BLOOD PRESSURE: 70 MMHG | TEMPERATURE: 97.1 F

## 2024-10-24 DIAGNOSIS — N95.1 SYMPTOMS, SUCH AS FLUSHING, SLEEPLESSNESS, HEADACHE, LACK OF CONCENTRATION, ASSOCIATED WITH THE MENOPAUSE: ICD-10-CM

## 2024-10-24 DIAGNOSIS — E01.0 THYROMEGALY: ICD-10-CM

## 2024-10-24 DIAGNOSIS — R49.0 HOARSENESS OF VOICE: ICD-10-CM

## 2024-10-24 DIAGNOSIS — K21.00 GASTROESOPHAGEAL REFLUX DISEASE WITH ESOPHAGITIS, UNSPECIFIED WHETHER HEMORRHAGE: ICD-10-CM

## 2024-10-24 DIAGNOSIS — Z00.00 ENCOUNTER FOR WELL ADULT EXAM WITHOUT ABNORMAL FINDINGS: ICD-10-CM

## 2024-10-24 DIAGNOSIS — Z00.00 ENCOUNTER FOR WELL ADULT EXAM WITHOUT ABNORMAL FINDINGS: Primary | ICD-10-CM

## 2024-10-24 DIAGNOSIS — F41.9 ANXIETY: ICD-10-CM

## 2024-10-24 LAB
ALBUMIN: 4.7 G/DL (ref 3.5–5.2)
ALP BLD-CCNC: 67 U/L (ref 35–104)
ALT SERPL-CCNC: 18 U/L (ref 0–32)
ANION GAP SERPL CALCULATED.3IONS-SCNC: 13 MMOL/L (ref 7–16)
AST SERPL-CCNC: 20 U/L (ref 0–31)
BASOPHILS ABSOLUTE: 0.04 K/UL (ref 0–0.2)
BASOPHILS RELATIVE PERCENT: 1 % (ref 0–2)
BILIRUB SERPL-MCNC: 0.5 MG/DL (ref 0–1.2)
BUN BLDV-MCNC: 13 MG/DL (ref 6–20)
CALCIUM SERPL-MCNC: 10.3 MG/DL (ref 8.6–10.2)
CHLORIDE BLD-SCNC: 99 MMOL/L (ref 98–107)
CHOLESTEROL, TOTAL: 224 MG/DL
CO2: 25 MMOL/L (ref 22–29)
CREAT SERPL-MCNC: 0.7 MG/DL (ref 0.5–1)
EOSINOPHILS ABSOLUTE: 0.03 K/UL (ref 0.05–0.5)
EOSINOPHILS RELATIVE PERCENT: 0 % (ref 0–6)
FOLLICLE STIMULATING HORMONE: 7.7 MIU/ML
GFR, ESTIMATED: >90 ML/MIN/1.73M2
GLUCOSE BLD-MCNC: 132 MG/DL (ref 74–99)
HCT VFR BLD CALC: 44 % (ref 34–48)
HDLC SERPL-MCNC: 74 MG/DL
HEMOGLOBIN: 14.5 G/DL (ref 11.5–15.5)
IMMATURE GRANULOCYTES %: 1 % (ref 0–5)
IMMATURE GRANULOCYTES ABSOLUTE: 0.04 K/UL (ref 0–0.58)
LDL CHOLESTEROL: 118 MG/DL
LH: 8.3 MIU/ML
LYMPHOCYTES ABSOLUTE: 1.24 K/UL (ref 1.5–4)
LYMPHOCYTES RELATIVE PERCENT: 15 % (ref 20–42)
MCH RBC QN AUTO: 31.9 PG (ref 26–35)
MCHC RBC AUTO-ENTMCNC: 33 G/DL (ref 32–34.5)
MCV RBC AUTO: 96.9 FL (ref 80–99.9)
MONOCYTES ABSOLUTE: 0.59 K/UL (ref 0.1–0.95)
MONOCYTES RELATIVE PERCENT: 7 % (ref 2–12)
NEUTROPHILS ABSOLUTE: 6.62 K/UL (ref 1.8–7.3)
NEUTROPHILS RELATIVE PERCENT: 77 % (ref 43–80)
PDW BLD-RTO: 12.4 % (ref 11.5–15)
PLATELET # BLD: 447 K/UL (ref 130–450)
PMV BLD AUTO: 9 FL (ref 7–12)
POTASSIUM SERPL-SCNC: 4.6 MMOL/L (ref 3.5–5)
RBC # BLD: 4.54 M/UL (ref 3.5–5.5)
SODIUM BLD-SCNC: 137 MMOL/L (ref 132–146)
TOTAL PROTEIN: 7.7 G/DL (ref 6.4–8.3)
TRIGL SERPL-MCNC: 158 MG/DL
TSH SERPL DL<=0.05 MIU/L-ACNC: 0.9 UIU/ML (ref 0.27–4.2)
VLDLC SERPL CALC-MCNC: 32 MG/DL
WBC # BLD: 8.6 K/UL (ref 4.5–11.5)

## 2024-10-24 PROCEDURE — 99396 PREV VISIT EST AGE 40-64: CPT | Performed by: FAMILY MEDICINE

## 2024-10-24 RX ORDER — SUCRALFATE 1 G/1
1 TABLET ORAL 4 TIMES DAILY
Qty: 120 TABLET | Refills: 3 | Status: SHIPPED | OUTPATIENT
Start: 2024-10-24

## 2024-10-24 RX ORDER — FLUTICASONE PROPIONATE 50 MCG
2 SPRAY, SUSPENSION (ML) NASAL DAILY
Qty: 16 G | Refills: 5 | Status: SHIPPED | OUTPATIENT
Start: 2024-10-24

## 2024-10-24 RX ORDER — AZELASTINE 1 MG/ML
1 SPRAY, METERED NASAL 2 TIMES DAILY PRN
Qty: 30 ML | Refills: 2 | Status: SHIPPED | OUTPATIENT
Start: 2024-10-24

## 2024-10-24 RX ORDER — MONTELUKAST SODIUM 10 MG/1
TABLET ORAL
Qty: 90 TABLET | Refills: 3 | Status: SHIPPED | OUTPATIENT
Start: 2024-10-24

## 2024-10-24 RX ORDER — PANTOPRAZOLE SODIUM 40 MG/1
40 TABLET, DELAYED RELEASE ORAL
Qty: 90 TABLET | Refills: 1 | Status: SHIPPED | OUTPATIENT
Start: 2024-10-24

## 2024-10-24 RX ORDER — FLUVOXAMINE MALEATE 25 MG
25 TABLET ORAL NIGHTLY
Qty: 30 TABLET | Refills: 3 | Status: SHIPPED | OUTPATIENT
Start: 2024-10-24

## 2024-10-24 RX ORDER — PANTOPRAZOLE SODIUM 20 MG/1
20 TABLET, DELAYED RELEASE ORAL DAILY
Qty: 90 TABLET | Refills: 3 | Status: SHIPPED | OUTPATIENT
Start: 2024-10-24

## 2024-10-24 ASSESSMENT — ENCOUNTER SYMPTOMS
SINUS PRESSURE: 0
CHEST TIGHTNESS: 0
RHINORRHEA: 0
BLOOD IN STOOL: 0
APNEA: 0
COUGH: 0
FACIAL SWELLING: 0
SINUS PAIN: 0
DIARRHEA: 0
ABDOMINAL DISTENTION: 0
SHORTNESS OF BREATH: 0
CONSTIPATION: 0
VOMITING: 0
COLOR CHANGE: 0
PHOTOPHOBIA: 0

## 2024-10-24 NOTE — PROGRESS NOTES
Physical Exam  Constitutional:       Appearance: Normal appearance.   HENT:      Head: Normocephalic and atraumatic.      Right Ear: There is no impacted cerumen.      Left Ear: There is no impacted cerumen.      Nose: Nose normal. No congestion or rhinorrhea.   Eyes:      Extraocular Movements: Extraocular movements intact.      Pupils: Pupils are equal, round, and reactive to light.   Cardiovascular:      Rate and Rhythm: Normal rate and regular rhythm.      Heart sounds: No murmur heard.     No friction rub. No gallop.   Pulmonary:      Effort: Pulmonary effort is normal.      Breath sounds: Normal breath sounds.   Chest:      Chest wall: No tenderness.   Abdominal:      General: Abdomen is flat. Bowel sounds are normal. There is no distension.      Palpations: Abdomen is soft.      Tenderness: There is no abdominal tenderness.   Musculoskeletal:         General: Normal range of motion.      Cervical back: Normal range of motion.   Skin:     General: Skin is warm and dry.   Neurological:      General: No focal deficit present.      Mental Status: She is alert and oriented to person, place, and time.   Psychiatric:         Mood and Affect: Mood normal.             Assessment & Plan   Encounter for well adult exam without abnormal findings  -     Lipid Panel; Future  -     Comprehensive Metabolic Panel; Future  -     TSH; Future  -     CBC with Auto Differential; Future  Hoarseness of voice  -     pantoprazole (PROTONIX) 20 MG tablet; Take 1 tablet by mouth daily, Disp-90 tablet, R-3Normal  Gastroesophageal reflux disease with esophagitis, unspecified whether hemorrhage  -     pantoprazole (PROTONIX) 40 MG tablet; Take 1 tablet by mouth every morning (before breakfast), Disp-90 tablet, R-1Normal  -     sucralfate (CARAFATE) 1 GM tablet; Take 1 tablet by mouth 4 times daily, Disp-120 tablet, R-3Normal  Symptoms, such as flushing, sleeplessness, headache, lack of concentration, associated with the menopause  -

## 2024-11-11 ENCOUNTER — TELEPHONE (OUTPATIENT)
Dept: FAMILY MEDICINE CLINIC | Age: 54
End: 2024-11-11

## 2024-11-11 NOTE — TELEPHONE ENCOUNTER
Patient calling to report, she has numbness in her left arm that comes and go since last night. Chest pain more left sided. She was not able to sleep last night. Reports urine is clear, BP at 1230 was 146/84. She is on daily GERD medication that is normally effective, she has been taking Tums with no relief today. I encouraged her to go to the ED now.

## 2024-11-15 ENCOUNTER — TELEPHONE (OUTPATIENT)
Dept: FAMILY MEDICINE CLINIC | Age: 54
End: 2024-11-15

## 2024-11-15 NOTE — TELEPHONE ENCOUNTER
Ragini was seen in the [facility] ER Yesterday for Chest Pain and numbness in the left arm.    They did and EKG, Xrays & Labs, and determined that she has a pinched nerve.     She needs a follow up with you.  She wants to be seen in [place].  Where can I put her on your schedule?

## 2024-11-18 NOTE — TELEPHONE ENCOUNTER
Patient was previously scheduled for a 1 month follow up 11/21. Patient can keep that appointment and we will discuss ED's findings.     Left a message updating Ragini. I asked her to call the office if she has a question or a more urgent need to be seen.

## 2024-11-21 ENCOUNTER — OFFICE VISIT (OUTPATIENT)
Dept: FAMILY MEDICINE CLINIC | Age: 54
End: 2024-11-21

## 2024-11-21 VITALS
BODY MASS INDEX: 26.57 KG/M2 | HEIGHT: 62 IN | DIASTOLIC BLOOD PRESSURE: 76 MMHG | HEART RATE: 91 BPM | OXYGEN SATURATION: 98 % | TEMPERATURE: 97 F | SYSTOLIC BLOOD PRESSURE: 124 MMHG | RESPIRATION RATE: 18 BRPM | WEIGHT: 144.38 LBS

## 2024-11-21 DIAGNOSIS — Z00.00 HEALTHCARE MAINTENANCE: ICD-10-CM

## 2024-11-21 DIAGNOSIS — E83.52 HYPERCALCEMIA: ICD-10-CM

## 2024-11-21 DIAGNOSIS — M77.12 LATERAL EPICONDYLITIS OF LEFT ELBOW: ICD-10-CM

## 2024-11-21 DIAGNOSIS — K21.9 GASTROESOPHAGEAL REFLUX DISEASE WITHOUT ESOPHAGITIS: Primary | ICD-10-CM

## 2024-11-21 DIAGNOSIS — E16.2 HYPOGLYCEMIA: ICD-10-CM

## 2024-11-21 RX ORDER — AMPICILLIN TRIHYDRATE 250 MG
600 CAPSULE ORAL DAILY
COMMUNITY

## 2024-11-21 RX ORDER — BACLOFEN 10 MG/1
10 TABLET ORAL 3 TIMES DAILY
COMMUNITY

## 2024-11-21 RX ORDER — NAPROXEN 500 MG/1
250 TABLET ORAL 2 TIMES DAILY WITH MEALS
COMMUNITY

## 2024-11-21 SDOH — ECONOMIC STABILITY: TRANSPORTATION INSECURITY
IN THE PAST 12 MONTHS, HAS LACK OF TRANSPORTATION KEPT YOU FROM MEETINGS, WORK, OR FROM GETTING THINGS NEEDED FOR DAILY LIVING?: NO

## 2024-11-21 SDOH — ECONOMIC STABILITY: FOOD INSECURITY: WITHIN THE PAST 12 MONTHS, YOU WORRIED THAT YOUR FOOD WOULD RUN OUT BEFORE YOU GOT MONEY TO BUY MORE.: NEVER TRUE

## 2024-11-21 SDOH — ECONOMIC STABILITY: FOOD INSECURITY: WITHIN THE PAST 12 MONTHS, THE FOOD YOU BOUGHT JUST DIDN'T LAST AND YOU DIDN'T HAVE MONEY TO GET MORE.: NEVER TRUE

## 2024-11-21 SDOH — ECONOMIC STABILITY: INCOME INSECURITY: HOW HARD IS IT FOR YOU TO PAY FOR THE VERY BASICS LIKE FOOD, HOUSING, MEDICAL CARE, AND HEATING?: NOT VERY HARD

## 2024-11-21 ASSESSMENT — ENCOUNTER SYMPTOMS
CHEST TIGHTNESS: 0
ABDOMINAL DISTENTION: 0
SHORTNESS OF BREATH: 0
BLOOD IN STOOL: 0
PHOTOPHOBIA: 0
SINUS PAIN: 0
COLOR CHANGE: 0
VOMITING: 0
DIARRHEA: 0
SINUS PRESSURE: 0
COUGH: 0
RHINORRHEA: 0
CONSTIPATION: 0
APNEA: 0
FACIAL SWELLING: 0

## 2024-11-21 NOTE — PROGRESS NOTES
negative, indicating no cardiac issues. The symptoms do not suggest a pulmonary embolism. The ER visit concluded that the chest pain was likely musculoskeletal. She was advised to rest and consider massage therapy for relief.    2. Gastroesophageal Reflux Disease (GERD).  She has been taking pantoprazole and sucralfate, which seem to help. She has also made dietary changes and is sleeping upright, which has shown improvement. She was advised to continue taking pantoprazole and sucralfate for a total of 6 weeks. If symptoms persist, a referral to a general surgeon will be considered. She was also advised to limit her intake of processed foods and follow a GERD-friendly diet. A referral to Dr. Damon for an endoscopy was provided.    3. Lateral epicondylitis.  She is experiencing pain consistent with lateral epicondylitis (tennis elbow). She was advised to rest the affected arm and avoid strenuous activities. Naproxen 500 mg was recommended for pain management, to be taken only if she feels pain. Baclofen should be taken even more infrequently. She was advised against steroid injections due to her current stomach issues.    4. Hypercalcemia.  Her blood work from October showed mildly elevated calcium levels. If her calcium remains high, a parathyroid hormone test will be conducted to rule out any underlying issues.    5. Hypoglycemia.  She reported feeling weak and suspected hypoglycemia. Blood work from February and October showed normal blood sugar levels, with a non-fasting result of 132 in October. She was advised to snack throughout the day, especially when on the go, to maintain stable blood sugar levels.    6. Health Maintenance.  She was advised to continue monitoring her diet and avoid processed foods. She was also reminded to follow up on her hormone levels, which indicated she is not postmenopausal.    Follow-up  Return in 1 month for follow up.          Follow Up:  Return in about 4 weeks (around

## 2024-12-03 DIAGNOSIS — K21.00 GASTROESOPHAGEAL REFLUX DISEASE WITH ESOPHAGITIS, UNSPECIFIED WHETHER HEMORRHAGE: ICD-10-CM

## 2024-12-03 NOTE — TELEPHONE ENCOUNTER
Patient called and stated that she has been out of sucralfate 1 gm the last two days and needs it sent to [facility] in [facility].

## 2024-12-05 NOTE — TELEPHONE ENCOUNTER
Lets go ahead and wait.  If the patient has completed her course of the sucralfate that is a good thing, lets see if she has a return of her symptoms.

## 2024-12-09 RX ORDER — SUCRALFATE 1 G/1
1 TABLET ORAL 4 TIMES DAILY
Qty: 120 TABLET | Refills: 3 | OUTPATIENT
Start: 2024-12-09

## 2024-12-09 NOTE — TELEPHONE ENCOUNTER
Patient had Rx from [place] transferred to [place] pharmacy. She is back home and will trying stopping sucralfate. If symptoms return she will let us know.

## 2024-12-19 ENCOUNTER — TELEPHONE (OUTPATIENT)
Dept: CARDIOLOGY | Age: 54
End: 2024-12-19

## 2024-12-19 ENCOUNTER — OFFICE VISIT (OUTPATIENT)
Dept: FAMILY MEDICINE CLINIC | Age: 54
End: 2024-12-19
Payer: COMMERCIAL

## 2024-12-19 VITALS
WEIGHT: 137 LBS | RESPIRATION RATE: 18 BRPM | HEART RATE: 94 BPM | BODY MASS INDEX: 25.21 KG/M2 | DIASTOLIC BLOOD PRESSURE: 68 MMHG | HEIGHT: 62 IN | TEMPERATURE: 97.7 F | OXYGEN SATURATION: 98 % | SYSTOLIC BLOOD PRESSURE: 118 MMHG

## 2024-12-19 DIAGNOSIS — I20.9 ANGINA PECTORIS (HCC): ICD-10-CM

## 2024-12-19 DIAGNOSIS — R10.13 POSTPRANDIAL EPIGASTRIC PAIN: Primary | ICD-10-CM

## 2024-12-19 PROCEDURE — 99213 OFFICE O/P EST LOW 20 MIN: CPT | Performed by: FAMILY MEDICINE

## 2024-12-19 ASSESSMENT — ENCOUNTER SYMPTOMS
COUGH: 0
PHOTOPHOBIA: 0
DIARRHEA: 0
ABDOMINAL DISTENTION: 0
RHINORRHEA: 0
VOMITING: 0
CHEST TIGHTNESS: 0
SHORTNESS OF BREATH: 0
CONSTIPATION: 0
SINUS PAIN: 0
APNEA: 0
COLOR CHANGE: 0
FACIAL SWELLING: 0
SINUS PRESSURE: 0
BLOOD IN STOOL: 0

## 2024-12-19 NOTE — TELEPHONE ENCOUNTER
CALLED PATIENT 1X AND LEFT MESSAGE TO SCHEDULE STRESS TEST    Electronically signed by Candie Diaz on 12/19/2024 at 2:32 PM

## 2024-12-19 NOTE — PROGRESS NOTES
agrees with above counseling,assessment and plan.    All questions answered.     The patient (or guardian, if applicable) and other individuals in attendance with the patient were advised that Artificial Intelligence will be utilized during this visit to record, process the conversation to generate a clinical note, and support improvement of the AI technology. The patient (or guardian, if applicable) and other individuals in attendance at the appointment consented to the use of AI, including the recording.

## 2024-12-23 ENCOUNTER — TELEPHONE (OUTPATIENT)
Dept: CARDIOLOGY | Age: 54
End: 2024-12-23

## 2024-12-23 NOTE — TELEPHONE ENCOUNTER
Spoke with patient and confirmed Regular  stress test appointment on 12/26/2024 at 1130. Instructions for test,given including light breakfast allowed no caffeine 12 hours prior to the test , and  preprocedure information reviewed with patient, questions answered. Patient verbalized understanding.

## 2024-12-26 ENCOUNTER — OFFICE VISIT (OUTPATIENT)
Dept: SURGERY | Age: 54
End: 2024-12-26
Payer: COMMERCIAL

## 2024-12-26 ENCOUNTER — HOSPITAL ENCOUNTER (OUTPATIENT)
Dept: CARDIOLOGY | Age: 54
Discharge: HOME | End: 2024-12-28
Payer: COMMERCIAL

## 2024-12-26 VITALS
BODY MASS INDEX: 25.03 KG/M2 | HEIGHT: 62 IN | HEART RATE: 84 BPM | RESPIRATION RATE: 16 BRPM | WEIGHT: 136 LBS | SYSTOLIC BLOOD PRESSURE: 118 MMHG | DIASTOLIC BLOOD PRESSURE: 72 MMHG

## 2024-12-26 VITALS
BODY MASS INDEX: 25.12 KG/M2 | SYSTOLIC BLOOD PRESSURE: 118 MMHG | RESPIRATION RATE: 18 BRPM | HEART RATE: 77 BPM | WEIGHT: 136.5 LBS | HEIGHT: 62 IN | DIASTOLIC BLOOD PRESSURE: 66 MMHG | OXYGEN SATURATION: 98 %

## 2024-12-26 DIAGNOSIS — I20.9 ANGINA PECTORIS (HCC): ICD-10-CM

## 2024-12-26 DIAGNOSIS — K21.9 GASTROESOPHAGEAL REFLUX DISEASE, UNSPECIFIED WHETHER ESOPHAGITIS PRESENT: ICD-10-CM

## 2024-12-26 DIAGNOSIS — R13.13 PHARYNGEAL DYSPHAGIA: Primary | ICD-10-CM

## 2024-12-26 DIAGNOSIS — K82.4 GALLBLADDER POLYP: ICD-10-CM

## 2024-12-26 LAB
ECHO BSA: 1.64 M2
STRESS BASELINE DIAS BP: 62 MMHG
STRESS BASELINE HR: 85 BPM
STRESS BASELINE SYS BP: 118 MMHG
STRESS ESTIMATED WORKLOAD: 10 METS
STRESS EXERCISE DUR MIN: 8 MIN
STRESS EXERCISE DUR SEC: 1 SEC
STRESS PEAK DIAS BP: 62 MMHG
STRESS PEAK SYS BP: 168 MMHG
STRESS PERCENT HR ACHIEVED: 95 %
STRESS POST PEAK HR: 160 BPM
STRESS RATE PRESSURE PRODUCT: NORMAL BPM*MMHG
STRESS TARGET HR: 168 BPM

## 2024-12-26 PROCEDURE — 93018 CV STRESS TEST I&R ONLY: CPT | Performed by: INTERNAL MEDICINE

## 2024-12-26 PROCEDURE — 93017 CV STRESS TEST TRACING ONLY: CPT

## 2024-12-26 PROCEDURE — 93016 CV STRESS TEST SUPVJ ONLY: CPT | Performed by: INTERNAL MEDICINE

## 2024-12-26 PROCEDURE — 99214 OFFICE O/P EST MOD 30 MIN: CPT | Performed by: SURGERY

## 2024-12-30 NOTE — H&P (VIEW-ONLY)
GLUCOSE 99 11/11/2024 03:37 PM    CALCIUM 9.8 11/11/2024 03:37 PM    BILITOT 1.2 11/11/2024 03:37 PM    ALKPHOS 55 11/11/2024 03:37 PM    AST 15 11/11/2024 03:37 PM    ALT 13 11/11/2024 03:37 PM     PT/INR:    Lab Results   Component Value Date/Time    PROTIME 13.1 11/11/2024 03:37 PM    INR 1.19 11/11/2024 03:37 PM     HgBA1c:  No results found for: \"LABA1C\"  LIPASE:  No results found for: \"LIPASE\"       Chet Sanchez MD      I have examined the patient and performed the key aspects of the physical exam,and reviewed the record (including laboratory findings, results, and all pertinent radiology images, which are independently reviewed and interpreted unless otherwise explicitly stated). The referring provider's notes were also reviewed.    Any procedures planned or discussed as above had risks, benefits, and reasonable alternatives thoroughly discussed with the patient or responsible party.     If utilized, the patient (or guardian, if applicable) and other individuals in attendance with the patient were advised that Artificial Intelligence will be utilized during this visit to record, process the conversation to generate a clinical note, and support improvement of the AI technology. The patient (or guardian, if applicable) and other individuals in attendance at the appointment consented to the use of AI, including the recording.          NOTE: This report, in part or full, may have been transcribed using voice recognition software. Every effort was made to ensure accuracy; however, inadvertent computerized transcription errors may be present. Please excuse any transcriptional grammatical or spelling errors that may have escaped my editorial review.

## 2024-12-30 NOTE — PROGRESS NOTES
tablet by mouth nightly 30 tablet 3    Multiple Vitamin (MULTI-VITAMIN DAILY PO) Take by mouth       No current facility-administered medications for this visit.          The remainder of the past medical, past surgical, family, and psychosocial history, as well as medication and allergy review, were completed and are as documented elsewhere in the chart.          Objective:  Vitals:    12/26/24 1443   BP: 118/66   Pulse: 77   Resp: 18   SpO2: 98%   Weight: 61.9 kg (136 lb 8 oz)   Height: 1.575 m (5' 2\")        Body mass index is 24.97 kg/m².    Physical Exam      HENT:      Head: Normocephalic and atraumatic.   Eyes:      General:         Right eye: No discharge.         Left eye: No discharge.   Neck:      Trachea: No tracheal deviation.   Cardiovascular:      Rate and Rhythm: Normal rate.   Pulmonary:      Effort: Pulmonary effort is normal. No respiratory distress.   Abdominal:      General: There is no distension.      Palpations: Abdomen is soft.      Tenderness: There is no guarding or rebound.   Skin:     General: Skin is warm and dry.   Neurological:      Mental Status: She is alert and oriented to person, place, and time.     Physical Exam     CBC:   Lab Results   Component Value Date/Time    WBC 8.5 11/11/2024 03:37 PM    RBC 4.77 11/11/2024 03:37 PM    HGB 15.6 11/11/2024 03:37 PM    HCT 44.6 11/11/2024 03:37 PM    MCV 93.6 11/11/2024 03:37 PM    MCH 32.8 11/11/2024 03:37 PM    MCHC 35.0 11/11/2024 03:37 PM    RDW 12.5 11/11/2024 03:37 PM     11/11/2024 03:37 PM    MPV 6.9 11/11/2024 03:37 PM     CMP:    Lab Results   Component Value Date/Time     11/11/2024 03:37 PM    K 3.9 11/11/2024 03:37 PM     11/11/2024 03:37 PM    CO2 23 11/11/2024 03:37 PM    BUN 8 11/11/2024 03:37 PM    CREATININE 0.53 11/11/2024 03:37 PM    GFRAA >60 10/05/2022 12:10 PM    AGRATIO 1.8 11/11/2024 03:37 PM    LABGLOM 121 11/11/2024 03:37 PM    LABGLOM >90 10/24/2024 02:06 PM    LABGLOM >60 02/16/2024 04:43 PM

## 2024-12-31 ENCOUNTER — PREP FOR PROCEDURE (OUTPATIENT)
Dept: SURGERY | Age: 54
End: 2024-12-31

## 2024-12-31 ENCOUNTER — TELEPHONE (OUTPATIENT)
Dept: SURGERY | Age: 54
End: 2024-12-31

## 2024-12-31 DIAGNOSIS — R13.13 PHARYNGEAL DYSPHAGIA: ICD-10-CM

## 2024-12-31 PROBLEM — K21.9 GASTROESOPHAGEAL REFLUX DISEASE: Status: ACTIVE | Noted: 2024-12-31

## 2024-12-31 NOTE — TELEPHONE ENCOUNTER
Ragini Rush is scheduled for EGD with Dr Sanchez on 01-02-25 at [facility]. Patient needs to be NPO after midnight the night before procedure. All surgery instructions were explained to the patient and a surgery letter was also mailed out. MA informed patient that PAT will also be calling to review pre-op instructions and medications. Patient verbalized understanding.  MA spoke with Elio MORALES at [facility], [phone], who stated that procedure code 96056 (EGD) does not require prior authorization.  REF #[id number]  Electronically signed by Jewels White MA on 12/31/2024 at 9:53 AM

## 2024-12-31 NOTE — PROGRESS NOTES
[facility] PRE-ADMISSION TESTING INSTRUCTIONS    The Preadmission Testing patient is instructed accordingly using the following criteria (check applicable):    ARRIVAL INSTRUCTIONS:  [x] Parking the day of Surgery is located in the Main Entrance lot.  Upon entering the door, make an immediate right to the surgery reception desk    [x] Bring photo ID and insurance card    [x] Bring in a copy of Living will or Durable Power of  papers.    [x] Please be sure to arrange for a responsible adult to provide transportation to and from the hospital    [x] Please arrange for a responsible adult to be with you for the 24 hour period post procedure due to having anesthesia    [x] If you awake am of surgery not feeling well or have temperature >100 please call [phone]    GENERAL INSTRUCTIONS:    [x] No solid foods after midnight, no gum, candy or mints.  May have clear liquids until 4 hours prior to surgery.         [x] You may brush your teeth, do not swallow any toothpaste    [x] Take medications as instructed     [x] Stop herbal supplements and vitamins 5 days prior to procedure    [x] Bring urine specimen day of surgery    [x] Shower or bath with soap, lather and rinse well, AM of Surgery, no lotion, powders or creams to surgical site        [x] No tobacco products within 24 hours of surgery     [x] No alcohol or illegal drug use, marijuana included, within 24 hours of surgery.    [x] Jewelry, body piercing's, eyeglasses, contact lenses and dentures are not permitted into surgery (bring cases)      [x] Please do not wear any nail polish, make up or hair products on the day of surgery    [x] You can expect a call the business day prior to procedure to notify you if your arrival time changes    [x] If you receive a survey after surgery we would greatly appreciate your comments    [x] Please notify surgeon if you develop any illness between now and time of surgery (cold, cough, sore  throat, fever, nausea, vomiting) or any signs of infections  including skin, wounds, and dental.

## 2025-01-02 ENCOUNTER — ANESTHESIA (OUTPATIENT)
Dept: ENDOSCOPY | Age: 55
End: 2025-01-02
Payer: COMMERCIAL

## 2025-01-02 ENCOUNTER — ANESTHESIA EVENT (OUTPATIENT)
Dept: ENDOSCOPY | Age: 55
End: 2025-01-02
Payer: COMMERCIAL

## 2025-01-02 ENCOUNTER — HOSPITAL ENCOUNTER (OUTPATIENT)
Age: 55
Setting detail: OUTPATIENT SURGERY
Discharge: HOME | End: 2025-01-02
Attending: SURGERY | Admitting: SURGERY
Payer: COMMERCIAL

## 2025-01-02 VITALS
OXYGEN SATURATION: 98 % | RESPIRATION RATE: 16 BRPM | BODY MASS INDEX: 24.66 KG/M2 | SYSTOLIC BLOOD PRESSURE: 116 MMHG | WEIGHT: 134 LBS | HEIGHT: 62 IN | DIASTOLIC BLOOD PRESSURE: 58 MMHG | HEART RATE: 88 BPM

## 2025-01-02 DIAGNOSIS — K21.9 GASTROESOPHAGEAL REFLUX DISEASE: ICD-10-CM

## 2025-01-02 DIAGNOSIS — R13.13 PHARYNGEAL DYSPHAGIA: ICD-10-CM

## 2025-01-02 LAB
HCG, URINE, POC: NEGATIVE
Lab: NORMAL
NEGATIVE QC PASS/FAIL: NORMAL
POSITIVE QC PASS/FAIL: NORMAL

## 2025-01-02 PROCEDURE — 43239 EGD BIOPSY SINGLE/MULTIPLE: CPT | Performed by: SURGERY

## 2025-01-02 PROCEDURE — 6360000002 HC RX W HCPCS: Performed by: NURSE ANESTHETIST, CERTIFIED REGISTERED

## 2025-01-02 PROCEDURE — 2709999900 HC NON-CHARGEABLE SUPPLY: Performed by: SURGERY

## 2025-01-02 PROCEDURE — 88305 TISSUE EXAM BY PATHOLOGIST: CPT

## 2025-01-02 PROCEDURE — 7100000010 HC PHASE II RECOVERY - FIRST 15 MIN: Performed by: SURGERY

## 2025-01-02 PROCEDURE — 7100000011 HC PHASE II RECOVERY - ADDTL 15 MIN: Performed by: SURGERY

## 2025-01-02 PROCEDURE — 2580000003 HC RX 258: Performed by: NURSE ANESTHETIST, CERTIFIED REGISTERED

## 2025-01-02 PROCEDURE — 3609012400 HC EGD TRANSORAL BIOPSY SINGLE/MULTIPLE: Performed by: SURGERY

## 2025-01-02 PROCEDURE — 88342 IMHCHEM/IMCYTCHM 1ST ANTB: CPT

## 2025-01-02 PROCEDURE — 3700000000 HC ANESTHESIA ATTENDED CARE: Performed by: SURGERY

## 2025-01-02 RX ORDER — ACETAMINOPHEN 325 MG/1
650 TABLET ORAL
Status: DISCONTINUED | OUTPATIENT
Start: 2025-01-02 | End: 2025-01-02 | Stop reason: HOSPADM

## 2025-01-02 RX ORDER — SODIUM CHLORIDE 0.9 % (FLUSH) 0.9 %
5-40 SYRINGE (ML) INJECTION PRN
Status: DISCONTINUED | OUTPATIENT
Start: 2025-01-02 | End: 2025-01-02 | Stop reason: HOSPADM

## 2025-01-02 RX ORDER — LIDOCAINE HYDROCHLORIDE 20 MG/ML
INJECTION, SOLUTION EPIDURAL; INFILTRATION; INTRACAUDAL; PERINEURAL
Status: DISCONTINUED | OUTPATIENT
Start: 2025-01-02 | End: 2025-01-02 | Stop reason: SDUPTHER

## 2025-01-02 RX ORDER — DROPERIDOL 2.5 MG/ML
0.62 INJECTION, SOLUTION INTRAMUSCULAR; INTRAVENOUS
Status: DISCONTINUED | OUTPATIENT
Start: 2025-01-02 | End: 2025-01-02 | Stop reason: HOSPADM

## 2025-01-02 RX ORDER — HYDRALAZINE HYDROCHLORIDE 20 MG/ML
5 INJECTION INTRAMUSCULAR; INTRAVENOUS
Status: DISCONTINUED | OUTPATIENT
Start: 2025-01-02 | End: 2025-01-02 | Stop reason: HOSPADM

## 2025-01-02 RX ORDER — PROPOFOL 10 MG/ML
INJECTION, EMULSION INTRAVENOUS
Status: DISCONTINUED | OUTPATIENT
Start: 2025-01-02 | End: 2025-01-02 | Stop reason: SDUPTHER

## 2025-01-02 RX ORDER — ONDANSETRON 2 MG/ML
4 INJECTION INTRAMUSCULAR; INTRAVENOUS
Status: DISCONTINUED | OUTPATIENT
Start: 2025-01-02 | End: 2025-01-02 | Stop reason: HOSPADM

## 2025-01-02 RX ORDER — MEPERIDINE HYDROCHLORIDE 25 MG/ML
12.5 INJECTION INTRAMUSCULAR; INTRAVENOUS; SUBCUTANEOUS EVERY 5 MIN PRN
Status: DISCONTINUED | OUTPATIENT
Start: 2025-01-02 | End: 2025-01-02 | Stop reason: HOSPADM

## 2025-01-02 RX ORDER — IPRATROPIUM BROMIDE AND ALBUTEROL SULFATE 2.5; .5 MG/3ML; MG/3ML
1 SOLUTION RESPIRATORY (INHALATION)
Status: DISCONTINUED | OUTPATIENT
Start: 2025-01-02 | End: 2025-01-02 | Stop reason: HOSPADM

## 2025-01-02 RX ORDER — MIDAZOLAM HYDROCHLORIDE 2 MG/2ML
2 INJECTION, SOLUTION INTRAMUSCULAR; INTRAVENOUS
Status: DISCONTINUED | OUTPATIENT
Start: 2025-01-02 | End: 2025-01-02 | Stop reason: HOSPADM

## 2025-01-02 RX ORDER — LABETALOL HYDROCHLORIDE 5 MG/ML
5 INJECTION, SOLUTION INTRAVENOUS
Status: DISCONTINUED | OUTPATIENT
Start: 2025-01-02 | End: 2025-01-02 | Stop reason: HOSPADM

## 2025-01-02 RX ORDER — NALOXONE HYDROCHLORIDE 0.4 MG/ML
INJECTION, SOLUTION INTRAMUSCULAR; INTRAVENOUS; SUBCUTANEOUS PRN
Status: DISCONTINUED | OUTPATIENT
Start: 2025-01-02 | End: 2025-01-02 | Stop reason: HOSPADM

## 2025-01-02 RX ORDER — DIPHENHYDRAMINE HYDROCHLORIDE 50 MG/ML
12.5 INJECTION INTRAMUSCULAR; INTRAVENOUS
Status: DISCONTINUED | OUTPATIENT
Start: 2025-01-02 | End: 2025-01-02 | Stop reason: HOSPADM

## 2025-01-02 RX ORDER — SODIUM CHLORIDE 9 MG/ML
INJECTION, SOLUTION INTRAVENOUS PRN
Status: DISCONTINUED | OUTPATIENT
Start: 2025-01-02 | End: 2025-01-02 | Stop reason: HOSPADM

## 2025-01-02 RX ORDER — SODIUM CHLORIDE 0.9 % (FLUSH) 0.9 %
5-40 SYRINGE (ML) INJECTION EVERY 12 HOURS SCHEDULED
Status: DISCONTINUED | OUTPATIENT
Start: 2025-01-02 | End: 2025-01-02 | Stop reason: HOSPADM

## 2025-01-02 RX ORDER — SODIUM CHLORIDE 9 MG/ML
INJECTION, SOLUTION INTRAVENOUS
Status: DISCONTINUED | OUTPATIENT
Start: 2025-01-02 | End: 2025-01-02 | Stop reason: SDUPTHER

## 2025-01-02 RX ADMIN — PROPOFOL 220 MG: 10 INJECTION, EMULSION INTRAVENOUS at 08:02

## 2025-01-02 RX ADMIN — LIDOCAINE HYDROCHLORIDE 5 ML: 20 INJECTION, SOLUTION EPIDURAL; INFILTRATION; INTRACAUDAL; PERINEURAL at 08:02

## 2025-01-02 RX ADMIN — SODIUM CHLORIDE: 9 INJECTION, SOLUTION INTRAVENOUS at 07:50

## 2025-01-02 ASSESSMENT — LIFESTYLE VARIABLES: SMOKING_STATUS: 0

## 2025-01-02 ASSESSMENT — PAIN - FUNCTIONAL ASSESSMENT: PAIN_FUNCTIONAL_ASSESSMENT: NONE - DENIES PAIN

## 2025-01-02 NOTE — INTERVAL H&P NOTE
Update History & Physical    The patient's History and Physical was reviewed with the patient and I examined the patient. There was no change. The surgical site was confirmed by the patient and me.     Plan: The risks, benefits, expected outcome, and alternative to the recommended procedure have been discussed with the patient. Patient understands and wants to proceed with the procedure.     Electronically signed by Chet Sanchez MD on 1/2/2025 at 8:01 AM

## 2025-01-02 NOTE — ANESTHESIA PRE PROCEDURE
Department of Anesthesiology  Preprocedure Note       Name:  Ragini Rush   Age:  54 y.o.  :  1970                                          MRN:  [id number]         Date:  2025      Surgeon: Surgeon(s):  Chet Sanchez MD    Procedure: Procedure(s):  ESOPHAGOGASTRODUODENOSCOPY    Medications prior to admission:   Prior to Admission medications    Medication Sig Start Date End Date Taking? Authorizing Provider   Red Yeast Rice 600 MG CAPS Take 600 mg by mouth 2 times daily   Yes Nicol Parikh MD   pantoprazole (PROTONIX) 40 MG tablet Take 1 tablet by mouth every morning (before breakfast) 10/24/24  Yes Hugo Baez MD   Multiple Vitamin (MULTI-VITAMIN DAILY PO) Take by mouth   Yes Nicol Parikh MD   montelukast (SINGULAIR) 10 MG tablet TAKE ONE TABLET BY MOUTH EVERY DAY 10/24/24   Hugo Baez MD   azelastine (ASTELIN) 0.1 % nasal spray 1 spray by Nasal route 2 times daily as needed for Rhinitis Use in each nostril as directed 10/24/24   Hugo Baez MD   fluvoxaMINE (LUVOX) 25 MG tablet Take 1 tablet by mouth nightly  Patient taking differently: Take 1 tablet by mouth nightly Pt takes 1/2 tablet 10/24/24   Hugo Baez MD       Current medications:    No current facility-administered medications for this encounter.       Allergies:  No Known Allergies    Problem List:    Patient Active Problem List   Diagnosis Code    Exudative tonsillitis J03.90    Acute bacterial sinusitis J01.90, B96.89    Bronchitis J40    Diverticulitis K57.92    Pharyngeal dysphagia R13.13    Gastroesophageal reflux disease K21.9       Past Medical History:        Diagnosis Date    GERD (gastroesophageal reflux disease)     Nephrolithiasis     left       Past Surgical History:        Procedure Laterality Date    COLONOSCOPY N/A 3/27/2024    COLONOSCOPY POLYPECTOMY SNARE/COLD BIOPSY performed by Chet Sanchez MD at [facility] ENDOSCOPY    LITHOTRIPSY N/A     SIGMOID COLECTOMY N/A        Social History:

## 2025-01-02 NOTE — ANESTHESIA POSTPROCEDURE EVALUATION
Department of Anesthesiology  Postprocedure Note    Patient: Ragini Rush  MRN: [id number]  YOB: 1970  Date of evaluation: 1/2/2025    Procedure Summary       Date: 01/02/25 Room / Location: [place] [address] / [facility]    Anesthesia Start: 0800 Anesthesia Stop: 0809    Procedure: ESOPHAGOGASTRODUODENOSCOPY BIOPSY Diagnosis:       Pharyngeal dysphagia      Gastroesophageal reflux disease      (Pharyngeal dysphagia [R13.13])      (Gastroesophageal reflux disease [K21.9])    Surgeons: Chet Sanchez MD Responsible Provider: Marcella Obrien MD    Anesthesia Type: MAC ASA Status: 2            Anesthesia Type: MAC    Brianna Phase I: Brianna Score: 10    Brianna Phase II:      Anesthesia Post Evaluation    Patient location during evaluation: bedside  Patient participation: complete - patient participated  Level of consciousness: awake  Pain score: 0  Airway patency: patent  Nausea & Vomiting: no nausea and no vomiting  Cardiovascular status: blood pressure returned to baseline and hemodynamically stable  Respiratory status: acceptable  Hydration status: stable  Pain management: adequate and satisfactory to patient        No notable events documented.

## 2025-01-06 LAB — SURGICAL PATHOLOGY REPORT: NORMAL

## 2025-01-07 ENCOUNTER — TELEPHONE (OUTPATIENT)
Dept: FAMILY MEDICINE CLINIC | Age: 55
End: 2025-01-07

## 2025-01-17 ENCOUNTER — OFFICE VISIT (OUTPATIENT)
Dept: SURGERY | Age: 55
End: 2025-01-17
Payer: COMMERCIAL

## 2025-01-17 VITALS
BODY MASS INDEX: 25.21 KG/M2 | RESPIRATION RATE: 18 BRPM | DIASTOLIC BLOOD PRESSURE: 80 MMHG | SYSTOLIC BLOOD PRESSURE: 143 MMHG | HEART RATE: 80 BPM | HEIGHT: 62 IN | WEIGHT: 137 LBS | OXYGEN SATURATION: 97 %

## 2025-01-17 DIAGNOSIS — K82.4 GALLBLADDER POLYP: ICD-10-CM

## 2025-01-17 DIAGNOSIS — K21.9 GASTROESOPHAGEAL REFLUX DISEASE, UNSPECIFIED WHETHER ESOPHAGITIS PRESENT: Primary | ICD-10-CM

## 2025-01-17 PROCEDURE — 99214 OFFICE O/P EST MOD 30 MIN: CPT | Performed by: SURGERY

## 2025-01-25 NOTE — PROGRESS NOTES
[facility] Surgery Clinic Note    Assessment & Plan  1. Gastroesophageal Reflux Disease (GERD).  The patient has a small hiatal hernia and chronic active gastritis, which may be contributing to her GERD symptoms. She is currently taking Protonix, which seems to help. She was advised that she can take Pepcid and Tums as needed for additional relief. The possibility of esophageal spasms was discussed, and an esophagram was recommended to evaluate for any spasms or hypertonic contractions. An esophagram will be ordered to assess for esophageal spasms. She was advised to maintain a food diary to identify potential triggers and to consume foods that might exacerbate her symptoms prior to the esophagram to increase the likelihood of capturing relevant data.     2. Gallbladder polyp  It was noted that her symptoms are not typical of gallbladder issues, and a repeat ultrasound will be scheduled in a year unless she develops new symptoms.     PROCEDURE  Upper endoscopy showed a 1 cm hiatal hernia and normal esophagus, stomach, and duodenum.    No follow-ups on file.    Ragini was seen today for follow-up.    Diagnoses and all orders for this visit:    Gastroesophageal reflux disease, unspecified whether esophagitis present  -     FL ESOPHAGRAM; Future          Chief Complaint   Patient presents with    Follow-up     Colonoscopy follow up, 01-02-25         PCP: Hugo Baez MD  CC: No ref. provider found     Ragini Rush is a 54 y.o. female .    History of Present Illness  The patient presents for a gastrointestinal follow-up. She was having chest pain and reflux. She underwent an upper endoscopy which showed a 1 cm hiatal hernia, normal esophagus was seen as well as stomach and duodenum. Pathology showed chronic active gastritis, negative for H. pylori, duodenal and esophageal biopsies are negative for pathology of sprue or eosinophilic esophagitis.    She reports experiencing mild soreness in the area of her

## 2025-02-05 ENCOUNTER — OFFICE VISIT (OUTPATIENT)
Dept: FAMILY MEDICINE CLINIC | Age: 55
End: 2025-02-05
Payer: COMMERCIAL

## 2025-02-05 VITALS
HEIGHT: 62 IN | WEIGHT: 137 LBS | OXYGEN SATURATION: 98 % | HEART RATE: 106 BPM | TEMPERATURE: 99.3 F | BODY MASS INDEX: 25.21 KG/M2 | RESPIRATION RATE: 18 BRPM | SYSTOLIC BLOOD PRESSURE: 130 MMHG | DIASTOLIC BLOOD PRESSURE: 76 MMHG

## 2025-02-05 DIAGNOSIS — J06.9 UPPER RESPIRATORY TRACT INFECTION, UNSPECIFIED TYPE: Primary | ICD-10-CM

## 2025-02-05 PROCEDURE — 99213 OFFICE O/P EST LOW 20 MIN: CPT | Performed by: PHYSICIAN ASSISTANT

## 2025-02-05 RX ORDER — AZITHROMYCIN 250 MG/1
TABLET, FILM COATED ORAL
Qty: 6 TABLET | Refills: 0 | Status: SHIPPED | OUTPATIENT
Start: 2025-02-05 | End: 2025-02-15

## 2025-02-05 RX ORDER — METHYLPREDNISOLONE 4 MG/1
TABLET ORAL
Qty: 1 KIT | Refills: 0 | Status: SHIPPED | OUTPATIENT
Start: 2025-02-05

## 2025-02-05 ASSESSMENT — ENCOUNTER SYMPTOMS
SORE THROAT: 0
VOMITING: 0
BACK PAIN: 0
PHOTOPHOBIA: 0
SHORTNESS OF BREATH: 0
NAUSEA: 0
ABDOMINAL PAIN: 0
DIARRHEA: 0
COUGH: 0

## 2025-02-05 NOTE — PROGRESS NOTES
25  Ragini Rush : 1970 Sex: female  Age 54 y.o.      Subjective:  Chief Complaint   Patient presents with    Congestion         54-year-old female presents to the walk-in clinic with her  for evaluation of cough and her ears feeling clogged.  She states her symptoms began just today.  She is taking some Singulair and Zyrtec.  Patient denies fever, chills, nausea or vomiting.  No shortness of breath or chest pain.  Her  is sick with similar symptoms but he started before her and has more flulike symptoms.  Patient denies any other associated symptoms.            Review of Systems   Constitutional:  Negative for chills and fever.   HENT:  Positive for congestion and ear pain. Negative for sore throat.    Eyes:  Negative for photophobia and visual disturbance.   Respiratory:  Negative for cough and shortness of breath.    Cardiovascular:  Negative for chest pain.   Gastrointestinal:  Negative for abdominal pain, diarrhea, nausea and vomiting.   Genitourinary:  Negative for difficulty urinating, dysuria, frequency and urgency.   Musculoskeletal:  Negative for back pain, neck pain and neck stiffness.   Skin:  Negative for rash.   Neurological:  Negative for dizziness, syncope, weakness, light-headedness and headaches.   Hematological:  Negative for adenopathy. Does not bruise/bleed easily.   Psychiatric/Behavioral:  Negative for agitation and confusion.    All other systems reviewed and are negative.        PMH:     Past Medical History:   Diagnosis Date    GERD (gastroesophageal reflux disease)     Nephrolithiasis     left       Past Surgical History:   Procedure Laterality Date    COLONOSCOPY N/A 3/27/2024    COLONOSCOPY POLYPECTOMY SNARE/COLD BIOPSY performed by Chet Sanchez MD at [facility] ENDOSCOPY    LITHOTRIPSY N/A     SIGMOID COLECTOMY N/A     UPPER GASTROINTESTINAL ENDOSCOPY N/A 2025    ESOPHAGOGASTRODUODENOSCOPY BIOPSY performed by Chet Sanchez MD at [facility] ENDOSCOPY

## 2025-03-19 ASSESSMENT — PATIENT HEALTH QUESTIONNAIRE - PHQ9
1. LITTLE INTEREST OR PLEASURE IN DOING THINGS: NOT AT ALL
2. FEELING DOWN, DEPRESSED OR HOPELESS: NOT AT ALL
2. FEELING DOWN, DEPRESSED OR HOPELESS: NOT AT ALL
1. LITTLE INTEREST OR PLEASURE IN DOING THINGS: NOT AT ALL
SUM OF ALL RESPONSES TO PHQ QUESTIONS 1-9: 0
SUM OF ALL RESPONSES TO PHQ QUESTIONS 1-9: 0
SUM OF ALL RESPONSES TO PHQ9 QUESTIONS 1 & 2: 0
SUM OF ALL RESPONSES TO PHQ QUESTIONS 1-9: 0
SUM OF ALL RESPONSES TO PHQ QUESTIONS 1-9: 0

## 2025-03-20 ENCOUNTER — OFFICE VISIT (OUTPATIENT)
Dept: FAMILY MEDICINE CLINIC | Age: 55
End: 2025-03-20
Payer: COMMERCIAL

## 2025-03-20 VITALS
WEIGHT: 140 LBS | RESPIRATION RATE: 18 BRPM | BODY MASS INDEX: 25.76 KG/M2 | SYSTOLIC BLOOD PRESSURE: 130 MMHG | HEART RATE: 97 BPM | TEMPERATURE: 97.1 F | DIASTOLIC BLOOD PRESSURE: 68 MMHG | HEIGHT: 62 IN | OXYGEN SATURATION: 98 %

## 2025-03-20 DIAGNOSIS — M25.511 ACUTE PAIN OF RIGHT SHOULDER: ICD-10-CM

## 2025-03-20 DIAGNOSIS — K21.00 GASTROESOPHAGEAL REFLUX DISEASE WITH ESOPHAGITIS, UNSPECIFIED WHETHER HEMORRHAGE: ICD-10-CM

## 2025-03-20 DIAGNOSIS — M54.12 CERVICAL RADICULOPATHY: Primary | ICD-10-CM

## 2025-03-20 PROCEDURE — 99213 OFFICE O/P EST LOW 20 MIN: CPT | Performed by: FAMILY MEDICINE

## 2025-03-20 RX ORDER — FLUTICASONE PROPIONATE 50 MCG
1 SPRAY, SUSPENSION (ML) NASAL 2 TIMES DAILY
Qty: 16 G | Refills: 5 | Status: SHIPPED | OUTPATIENT
Start: 2025-03-20

## 2025-03-20 RX ORDER — FLUTICASONE PROPIONATE 50 MCG
1 SPRAY, SUSPENSION (ML) NASAL 2 TIMES DAILY
COMMUNITY
End: 2025-03-20 | Stop reason: SDUPTHER

## 2025-03-20 RX ORDER — PANTOPRAZOLE SODIUM 40 MG/1
40 TABLET, DELAYED RELEASE ORAL
Qty: 90 TABLET | Refills: 1 | Status: SHIPPED | OUTPATIENT
Start: 2025-03-20

## 2025-03-20 RX ORDER — CETIRIZINE HYDROCHLORIDE 10 MG/1
10 TABLET ORAL DAILY
COMMUNITY

## 2025-03-20 ASSESSMENT — ANXIETY QUESTIONNAIRES
5. BEING SO RESTLESS THAT IT IS HARD TO SIT STILL: NOT AT ALL
4. TROUBLE RELAXING: NOT AT ALL
3. WORRYING TOO MUCH ABOUT DIFFERENT THINGS: NOT AT ALL
7. FEELING AFRAID AS IF SOMETHING AWFUL MIGHT HAPPEN: NOT AT ALL
GAD7 TOTAL SCORE: 0
1. FEELING NERVOUS, ANXIOUS, OR ON EDGE: NOT AT ALL
IF YOU CHECKED OFF ANY PROBLEMS ON THIS QUESTIONNAIRE, HOW DIFFICULT HAVE THESE PROBLEMS MADE IT FOR YOU TO DO YOUR WORK, TAKE CARE OF THINGS AT HOME, OR GET ALONG WITH OTHER PEOPLE: NOT DIFFICULT AT ALL
6. BECOMING EASILY ANNOYED OR IRRITABLE: NOT AT ALL
2. NOT BEING ABLE TO STOP OR CONTROL WORRYING: NOT AT ALL

## 2025-03-20 NOTE — PROGRESS NOTES
in stool, constipation, diarrhea and vomiting.   Endocrine: Negative for cold intolerance, polydipsia, polyphagia and polyuria.   Genitourinary:  Negative for decreased urine volume, frequency and urgency.   Musculoskeletal:  Negative for arthralgias and gait problem.   Skin:  Negative for color change.   Allergic/Immunologic: Negative for environmental allergies and food allergies.   Neurological:  Negative for dizziness, light-headedness and headaches.   Hematological:  Negative for adenopathy.   Psychiatric/Behavioral:  Negative for agitation and confusion.        Physical Exam  Constitutional:       Appearance: Normal appearance. She is normal weight.   HENT:      Head: Normocephalic and atraumatic.      Right Ear: External ear normal.      Left Ear: External ear normal.      Nose: Nose normal.      Mouth/Throat:      Mouth: Mucous membranes are moist.      Pharynx: Oropharynx is clear.   Eyes:      Extraocular Movements: Extraocular movements intact.      Pupils: Pupils are equal, round, and reactive to light.   Cardiovascular:      Rate and Rhythm: Normal rate and regular rhythm.   Pulmonary:      Effort: Pulmonary effort is normal.      Breath sounds: Normal breath sounds.   Abdominal:      General: Abdomen is flat. Bowel sounds are normal.      Palpations: Abdomen is soft.   Musculoskeletal:         General: Normal range of motion.      Cervical back: Normal range of motion.   Skin:     General: Skin is warm.   Neurological:      General: No focal deficit present.      Mental Status: She is alert.   Psychiatric:         Mood and Affect: Mood normal.         Assessment:  Assessment & Plan  1. Shoulder pain: Potential rotator cuff injury, possibly involving the supraspinatus or subscapularis. Differential diagnosis includes cervical radiculopathy.  - Continue with massage therapy  - Avoid lifting heavy objects  - Referral for physical therapy  - Order x-ray of the neck and shoulder    2. Postprandial

## 2025-03-21 ENCOUNTER — RESULTS FOLLOW-UP (OUTPATIENT)
Dept: FAMILY MEDICINE CLINIC | Age: 55
End: 2025-03-21

## 2025-03-24 ENCOUNTER — TELEPHONE (OUTPATIENT)
Dept: PHYSICAL THERAPY | Age: 55
End: 2025-03-24

## 2025-03-31 NOTE — PROGRESS NOTES
[facility]   Phone: [phone]   Fax: [phone]    Patient: Ragini Rush  : 1970  MRN: [id number]  Referring Provider: Hugo Baez MD  [address]  [place] [address]     Medical Diagnosis:   54.12 (ICD-10-CM) - Cervical radiculopathy      SUBJECTIVE:     Onset date: 6 months       Mechanism of Injury: Reports when started thought had tennis elbow. 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  Out of the blue in November couldn't sleep, had numbness down arm.   Went to ER and was told had a pinched nerve.  Reports massage is helpful.  Alleviates symptoms for a few weeks and then its back.      Previous PT: none    Medical Management for Current Problem: massage, tylenol , aspercreme,     Chief complaint: pain    Behavior: condition is waxing / waning    Pain:   Current: 5/10          Worst:10/10      Location:: Neck: left side neck to shoulder, between shoulder blades and down arm.          Provoking Activities/Positions: sleep, looking left / driving, lifting                  Relieving Activitie/Positions:  massage     Disturbed Sleep: yes    Imaging results: XR CERVICAL SPINE (4-5 VIEWS)  Result Date: 3/27/2025  EXAMINATION: 5 XRAY VIEWS OF THE CERVICAL SPINE 3/20/2025 10:39 am COMPARISON: None. HISTORY: ORDERING SYSTEM PROVIDED HISTORY: Cervical radiculopathy TECHNOLOGIST PROVIDED HISTORY: Reason for exam:->cervical radiculopathy FINDINGS: AP, lateral, both oblique, and odontoid views of the cervical spine reveal minimal multilevel degenerative changes seen throughout the cervical spine. There is anterior spurring at the C5/C6 level.  The facets are well aligned. Pedicles are intact.  The odontoid tip is intact.  The lateral masses are well aligned.  No significant neural foraminal narrowing identified.  No prevertebral soft tissue swelling.     Minimal multilevel degenerative changes seen throughout the cervical spine with anterior

## 2025-04-01 ENCOUNTER — EVALUATION (OUTPATIENT)
Dept: PHYSICAL THERAPY | Age: 55
End: 2025-04-01
Payer: COMMERCIAL

## 2025-04-01 DIAGNOSIS — M54.12 CERVICAL RADICULOPATHY: Primary | ICD-10-CM

## 2025-04-01 PROCEDURE — 97161 PT EVAL LOW COMPLEX 20 MIN: CPT | Performed by: PHYSICAL THERAPIST

## 2025-04-01 NOTE — PROGRESS NOTES
[facility]   Phone: [phone]   Fax: [phone]      Physical Therapy Daily Treatment Note    Date: 2025  Patient Name: Ragini Rush  : 1970   MRN: [id number]  DOInjury: 6 months   DOSx: NA   Referring Provider: Hugo Baez MD  [address]  [place] [address]     Medical Diagnosis:     54.12 (ICD-10-CM) - Cervical radiculopathy     Outcome Measure:  NDI 30%    S: See eval.  O:  Time 2827-9257     Visit 1/10 Repeat outcome measure at mid point and end.    Pain 5/10     ROM      Modalities      MH + ES      Ice            Manual                  Stretch            Levator scapulae stretch 1 x approx 10sec  HEP te               Exercise      Chin tuck Approx 3x  HEP  te   UBE  or      Bike      ROWS: H      ROWS: M      ROWS: L      Shoulder ER      [place] country ski      Shrugs      Shoulder Press      Cervical isometrics                  A:  Tolerated well.  Pt instructed in 2 exercises for HEP noted above.  Handout provided.  Pt instructed to discontinue if increases pain.  Pt demonstrates understanding. Instructed to call with any questions.      P: Continue with rehab plan.  Jacqueline Mercado, PT DPT, PT [id number]     Treatment Charges: Mins Units   Initial Evaluation 32 1   Re-Evaluation     Ther Exercise         TE 2    Manual Therapy     MT     Ther Activities        TA     Gait Training          GT     Neuro Re-education NR     Modalities     Non-Billable Service Time     Other     Total Time/Units 34 1

## 2025-04-07 ENCOUNTER — TREATMENT (OUTPATIENT)
Dept: PHYSICAL THERAPY | Age: 55
End: 2025-04-07
Payer: COMMERCIAL

## 2025-04-07 DIAGNOSIS — M54.12 CERVICAL RADICULOPATHY: Primary | ICD-10-CM

## 2025-04-07 PROCEDURE — 97110 THERAPEUTIC EXERCISES: CPT | Performed by: PHYSICAL THERAPIST

## 2025-04-07 PROCEDURE — 97140 MANUAL THERAPY 1/> REGIONS: CPT | Performed by: PHYSICAL THERAPIST

## 2025-04-07 NOTE — PROGRESS NOTES
[facility]   Phone: [phone]   Fax: [phone]      Physical Therapy Daily Treatment Note    Date: 2025  Patient Name: Ragini Rush  : 1970   MRN: [id number]  DOInjury: 6 months   DOSx: NA   Referring Provider:  Hugo Baez MD  [address]  [place] [address]      Medical Diagnosis:     54.12 (ICD-10-CM) - Cervical radiculopathy     Outcome Measure:  NDI 30%    S: pt reports 5/10 pain today.    O:  Time 8151-9892     Visit 2/10 Repeat outcome measure at mid point and end.    Pain See above.       ROM      Modalities      MH + ES      Ice            Manual      Massage area of L upper and mid trap  10 minutes   man         Stretch      Corner stretch 5 x 10s holds       Levator scapulae stretch 3 x approx 20sec  HEP te               Exercise      Chin tuck 10 x  HEP  te   UBE  or      Bike      ROWS: H      ROWS: M 2 x 10  GTB    ROWS: L      Upright row 2 x 10 3#    Shoulder ER      Cross country ski 2 x 10  GTB    B shoulder flexion to 90* 10 x  2#     B shoulder abd to 90* 10x  2#    Shoulder rolls 10x fwd and back     Shrugs      Y's, T's and reverse Y's  10x each      Shoulder Press 2 x 10   2#    Cervical isometrics      Cervical flexion/extension 10x each With towel     Cervical side bend  5 x 10s       Cervical rotation  10x each      A:  Tolerated well.  Reports L arm feels tired/heavy end of session.         P: Continue with rehab plan to pt tolerance.   Jacqueline Mercado, PT DPT, PT [id number]     Treatment Charges: Mins Units   Initial Evaluation     Re-Evaluation     Ther Exercise         TE 25 1   Manual Therapy     MT 10 1   Ther Activities        TA     Gait Training          GT     Neuro Re-education NR     Modalities     Non-Billable Service Time     Other     Total Time/Units 35 2

## 2025-04-10 ENCOUNTER — TREATMENT (OUTPATIENT)
Dept: PHYSICAL THERAPY | Age: 55
End: 2025-04-10
Payer: COMMERCIAL

## 2025-04-10 DIAGNOSIS — M54.12 CERVICAL RADICULOPATHY: Primary | ICD-10-CM

## 2025-04-10 PROCEDURE — 97110 THERAPEUTIC EXERCISES: CPT | Performed by: PHYSICAL THERAPIST

## 2025-04-10 PROCEDURE — 97140 MANUAL THERAPY 1/> REGIONS: CPT | Performed by: PHYSICAL THERAPIST

## 2025-04-10 NOTE — PROGRESS NOTES
[facility]   Phone: [phone]   Fax: [phone]      Physical Therapy Daily Treatment Note    Date: 4/10/2025  Patient Name: Ragini Rush  : 1970   MRN: [id number]  DOInjury: 6 months   DOSx: NA   Referring Provider:  Hugo Baez MD  [address]  [place] [address]      Medical Diagnosis:     54.12 (ICD-10-CM) - Cervical radiculopathy     Outcome Measure:  NDI 30%    S: pt reports 4/10 pain today.  Arrived late to session.   O:  Time 1011 -1040     Visit 3/10 Repeat outcome measure at mid point and end.    Pain See above.       ROM      Modalities      MH + ES      Ice            Manual      Massage area of L upper and mid trap  10 minutes   man         Stretch      Corner stretch 5 x 10s holds       Levator scapulae stretch 3 x approx 20sec  HEP te               Exercise      Chin tuck 20 x  HEP  te   UBE  or      Bike      ROWS: H      ROWS: M 2 x 10  GTB    ROWS: L      Upright row 3#    Shoulder ER      Cross country ski 2 x 10  GTB    B shoulder flexion to 90* 10 x  2#     B shoulder abd to 90* 10x  2#    Shoulder rolls 10x fwd and back     Shrugs      Y's, T's and reverse Y's  10x each      Shoulder Press 2 x 10   2#    Cervical isometrics      Cervical flexion/extension 10x each With towel     Cervical side bend  5 x 10s       Cervical rotation  10x each      A:  Tolerated well.       P: Continue with rehab plan.   Jacqueline Mercado, PT DPT, PT [id number]     Treatment Charges: Mins Units   Initial Evaluation     Re-Evaluation     Ther Exercise         TE 19 1   Manual Therapy     MT 10 1   Ther Activities        TA     Gait Training          GT     Neuro Re-education NR     Modalities     Non-Billable Service Time     Other     Total Time/Units 29  2

## 2025-04-15 ENCOUNTER — TREATMENT (OUTPATIENT)
Dept: PHYSICAL THERAPY | Age: 55
End: 2025-04-15
Payer: COMMERCIAL

## 2025-04-15 DIAGNOSIS — M54.12 CERVICAL RADICULOPATHY: Primary | ICD-10-CM

## 2025-04-15 PROCEDURE — 97140 MANUAL THERAPY 1/> REGIONS: CPT

## 2025-04-15 PROCEDURE — 97110 THERAPEUTIC EXERCISES: CPT

## 2025-04-15 NOTE — PROGRESS NOTES
[facility]   Phone: [phone]   Fax: [phone]      Physical Therapy Daily Treatment Note    Date: 4/15/2025  Patient Name: Ragini Rush  : 1970   MRN: [id number]  DOInjury: 6 months   DOSx: NA   Referring Provider:  Hugo Baez MD  [address]  [place] [address]      Medical Diagnosis:     54.12 (ICD-10-CM) - Cervical radiculopathy     Outcome Measure:  NDI 30%    S: Pt reports pain is about a 4/10 today.     O:  Time 7670-1951     Visit 4/10 Repeat outcome measure at mid point and end.    Pain See above.       ROM      Modalities      MH 5 min  With exercises    Ice            Manual      Massage area of L upper and mid trap  10 minutes   man         Stretch      Corner stretch 5 x 10s holds       Levator scapulae stretch 3 x approx 20sec  HEP te               Exercise      Chin tuck 20 x  HEP  te   UBE  or      Bike      ROWS: H      ROWS: M 2 x 10  GTB    ROWS: L      Upright row 2 x 10 3#    Shoulder ER      Cross country ski 2 x 10  GTB    B shoulder flexion to 90* 10 x 2 2#     B shoulder abd to 90* 10x 2 2#    Shoulder rolls 10x fwd and back     Shrugs      Y's, T's and reverse Y's  2 x 10  each      Shoulder Press 2 x 10   2#    Cervical isometrics      Cervical flexion/extension 10x each With towel     Cervical side bend  5 x 10s       Cervical rotation  10x each      A:  Tolerated well. Increased reps this session. Pt reports improved pain following session today. No rating given.      P: Continue with rehab plan.   ROSEANNE GUILLERMO, PTA [id number]     Treatment Charges: Mins Units   Initial Evaluation     Re-Evaluation     Ther Exercise         TE 28 2   Manual Therapy     MT 10 1   Ther Activities        TA     Gait Training          GT     Neuro Re-education NR     Modalities     Non-Billable Service Time     Other     Total Time/Units 38 3

## 2025-04-17 ENCOUNTER — TREATMENT (OUTPATIENT)
Dept: PHYSICAL THERAPY | Age: 55
End: 2025-04-17

## 2025-04-17 DIAGNOSIS — M54.12 CERVICAL RADICULOPATHY: Primary | ICD-10-CM

## 2025-04-17 NOTE — PROGRESS NOTES
[facility]   Phone: [phone]   Fax: [phone]      Physical Therapy Daily Treatment Note    Date: 2025  Patient Name: Ragini Rush  : 1970   MRN: [id number]  DOInjury: 6 months   DOSx: NA   Referring Provider:  Hugo Baez MD  [address]  [place] [address]      Medical Diagnosis:     54.12 (ICD-10-CM) - Cervical radiculopathy     Outcome Measure:  NDI 30%    S: Pt reports pain is about a 6/10 today. She reports that she did overhead lifting at work today.     O:  Time 3683-3859     Visit 5/10 Repeat outcome measure at mid point and end.    Pain See above.       ROM      Modalities      MH With exercises    Ice            Manual      Massage area of L upper and mid trap  10 minutes   man         Stretch      Corner stretch 5 x 10s holds       Levator scapulae stretch 3 x approx 20sec  HEP te               Exercise      Chin tuck 20 x  HEP  te   UBE  or      Bike      ROWS: H      ROWS: M 2 x 10  GTB    Punches X 20  GTB    Upright row 2 x 10 3#    Shoulder ER      Cross country ski 2 x 10  GTB    B shoulder flexion to 90* 10 x 2 2#     B shoulder abd to 90* 10x 2 2#    Shoulder rolls 10x fwd and back     [place] taps 2 x 10      Y's, T's and reverse Y's  2 x 10  each      Shoulder Press 2 x 10   2#    Cervical isometrics      Cervical flexion/extension 10x each With towel     Cervical side bend  5 x 10s       Cervical rotation  10x each      A:  Tolerated well. Increased reps this session. Pt reports improved pain following session today. No rating given.      P: Continue with rehab plan.   ROSEANNE GUILLERMO, PTA [id number]     Treatment Charges: Mins Units   Initial Evaluation     Re-Evaluation     Ther Exercise         TE 23 1   Manual Therapy     MT 10 1   Ther Activities        TA     Gait Training          GT     Neuro Re-education NR     Modalities     Non-Billable Service Time     Other     Total Time/Units 33 2

## 2025-04-21 ENCOUNTER — TREATMENT (OUTPATIENT)
Dept: PHYSICAL THERAPY | Age: 55
End: 2025-04-21
Payer: COMMERCIAL

## 2025-04-21 DIAGNOSIS — M54.12 CERVICAL RADICULOPATHY: Primary | ICD-10-CM

## 2025-04-21 PROCEDURE — 97110 THERAPEUTIC EXERCISES: CPT | Performed by: PHYSICAL THERAPIST

## 2025-04-21 PROCEDURE — 97140 MANUAL THERAPY 1/> REGIONS: CPT | Performed by: PHYSICAL THERAPIST

## 2025-04-21 NOTE — PROGRESS NOTES
[facility]   Phone: [phone]   Fax: [phone]      Physical Therapy Daily Treatment Note    Date: 2025  Patient Name: Ragini Rush  : 1970   MRN: [id number]  DOInjury: 6 months   DOSx: NA   Referring Provider:  Hugo Baez MD  [address]  [place] [address]      Medical Diagnosis:     54.12 (ICD-10-CM) - Cervical radiculopathy     Outcome Measure:  NDI 30%    S: Pt reports pain is ok today.  Reports hasn't done any lifting since Thursday.        O:  Time 1655- 172     Visit 6/10 Repeat outcome measure at mid point and end.    Pain See above.       ROM      Modalities      MH With exercises    Ice            Manual      Massage area of L upper and mid trap  10 minutes   man         Stretch      Corner stretch 5 x 10s holds       Levator scapulae stretch 3 x approx 20sec  HEP te               Exercise      Chin tuck 20 x  HEP  te   UBE  or      Bike      ROWS: H      ROWS: M 2 x 10  BTB    Punches X 20  BTB    Upright row 2 x 10 3#    Shoulder ER      Cross country ski 2 x 10  BTB    B shoulder flexion to 90* 10 x 2 2#     B shoulder abd to 90* 10x 2 2#    Shoulder rolls 10x fwd and back     [place] taps     Y's, T's and reverse Y's  2 x 10  each      Shoulder Press 2 x 10   2#    Cervical isometrics      Cervical flexion/extension 10x each With towel     Cervical side bend  5 x 10s       Cervical rotation  10x each      A:  Tolerated well. No complaints end of session.      P: Continue with rehab plan.   Jacqueline Mercado, PT [id number]     Treatment Charges: Mins Units   Initial Evaluation     Re-Evaluation     Ther Exercise         TE 23 1   Manual Therapy     MT 10 1   Ther Activities        TA     Gait Training          GT     Neuro Re-education NR     Modalities     Non-Billable Service Time     Other     Total Time/Units 33 2

## 2025-04-25 ENCOUNTER — TREATMENT (OUTPATIENT)
Dept: PHYSICAL THERAPY | Age: 55
End: 2025-04-25
Payer: COMMERCIAL

## 2025-04-25 ENCOUNTER — TELEPHONE (OUTPATIENT)
Dept: FAMILY MEDICINE CLINIC | Age: 55
End: 2025-04-25

## 2025-04-25 DIAGNOSIS — M54.12 CERVICAL RADICULOPATHY: Primary | ICD-10-CM

## 2025-04-25 PROCEDURE — 97110 THERAPEUTIC EXERCISES: CPT

## 2025-04-25 PROCEDURE — 97140 MANUAL THERAPY 1/> REGIONS: CPT

## 2025-04-25 NOTE — TELEPHONE ENCOUNTER
Patient stopped at  after physical therapy appt today requesting Cervical MRI.   Patient has been doing physical therapy which helps very temporarily. Pain radiates down from neck, down left arm into chest. Numbness/tingling radiates down entire arm into three fingers of left hand.   Going to massage therapy helps only for a few days. Lidocaine patch helps a little.   Does not want to wait until July OV with Dr. Baez to get testing done.     Last Appointment:  3/20/2025  Future Appointments   Date Time Provider Department Center   4/29/2025  3:40 PM Jacqueline Mercado, PT [facility]   5/8/2025  9:20 AM Latricia Arellano PTA [facility]   7/18/2025 10:45 AM Hugo Baez MD [facility] DE[facility]

## 2025-04-25 NOTE — PROGRESS NOTES
[facility]   Phone: [phone]   Fax: [phone]      Physical Therapy Daily Treatment Note    Date: 2025  Patient Name: Ragini Rush  : 1970   MRN: [id number]  DOInjury: 6 months   DOSx: NA   Referring Provider:  Hugo Baez MD  [address]  [place] [address]      Medical Diagnosis:     54.12 (ICD-10-CM) - Cervical radiculopathy     Outcome Measure:  NDI 30%    S: Pt reports increased pain today. She felt well after last session and then last night started feeling an increase in pain again. She used tens unit and heat/ice at home. She states that she cleaned the other day and did work but doesn't think she lifted or did anything repetitive that should have caused an increase in pain.      O:  Time 1655- 1728     Visit 6/10 Repeat outcome measure at mid point and end.    Pain See above.       ROM      Modalities      MH 5 min  With exercises    Ice            Manual      Massage area of L upper and mid trap  8 minutes   man         Stretch      Corner stretch 5 x 10s holds       Levator scapulae stretch 3 x approx 20sec  HEP te               Exercise      Chin tuck 20 x  HEP  te   UBE  or      Bike      ROWS: H      ROWS: M BTB    Punches BTB    Upright row 3#    Shoulder ER     Cross country ski BTB    B shoulder flexion to 90* 2#     B shoulder abd to 90* 2#    Shoulder rolls 10x fwd and back     [place] taps     Y's, T's and reverse Y's      Shoulder Press 2#    Rhomboid stretch 5 x 10s hold     Cervical isometrics      Cervical flexion/extension 10x each With towel     Cervical side bend  5 x 10s       Cervical rotation  10x each      A:  Tolerated well.Massage and gentle scap mobs performed with pt in supine. Pt tender in medial and upper trap and along spine of scapula. Added rhomboid stretch today. Omitted some exercises today secondary to increase in pain. Performed all ROM, showed pt how to use tens unit. Pt reports she may have had tens

## 2025-04-29 ENCOUNTER — TREATMENT (OUTPATIENT)
Dept: PHYSICAL THERAPY | Age: 55
End: 2025-04-29

## 2025-04-29 NOTE — PROGRESS NOTES
[facility]   Phone: [phone]   Fax: [phone]      Discharge Summary        REASON FOR DISCHARGE: Pt reports felt like symptoms were improving somewhat however recently having a setback with increased pain.  Reports contacted physician about having an MRI of neck.  After discussion with pt, pt choosing to discharge at this time.  Recommend pt refer back to referring physician.  Recommend pt call with any questions. Will discharge pt at this time.       RECOMMENDATIONS: call c questions or if additional services are warranted.      Thank you for the opportunity to work with your patient. If you have questions or comments, please contact me at numbers listed above.      Jacqueline Mercado, PT PT , DPT PT [id number] 4/30/2025

## 2025-05-13 DIAGNOSIS — F41.9 ANXIETY: ICD-10-CM

## 2025-05-13 NOTE — TELEPHONE ENCOUNTER
Name of Medication(s) Requested:  Requested Prescriptions     Pending Prescriptions Disp Refills    fluvoxaMINE (LUVOX) 25 MG tablet [Pharmacy Med Name: fluvoxaMINE Maleate Oral Tablet 25 MG] 30 tablet 0     Sig: TAKE ONE TABLET BY MOUTH NIGHTLY       Medication is on current medication list Yes    Dosage and directions were verified? Yes    Quantity verified: 90 day supply     Pharmacy Verified?  Yes    Last Appointment:  3/20/2025    Future appts:  Future Appointments   Date Time Provider Department Center   5/27/2025  2:45 PM Hugo Baez MD [facility] DEP   6/3/2025  1:15 PM [facility]   7/18/2025 10:45 AM Hugo Baez MD [facility] DEP        (If no appt send self scheduling link. .REFILLAPPT)  Scheduling request sent?     [] Yes  [x] No    Does patient need updated?  [] Yes  [x] No

## 2025-05-14 RX ORDER — FLUVOXAMINE MALEATE 25 MG
12.5 TABLET ORAL NIGHTLY
Qty: 45 TABLET | Refills: 0 | Status: SHIPPED | OUTPATIENT
Start: 2025-05-14 | End: 2025-08-12

## 2025-05-27 ENCOUNTER — OFFICE VISIT (OUTPATIENT)
Dept: FAMILY MEDICINE CLINIC | Age: 55
End: 2025-05-27
Payer: COMMERCIAL

## 2025-05-27 VITALS
OXYGEN SATURATION: 96 % | RESPIRATION RATE: 16 BRPM | HEART RATE: 91 BPM | SYSTOLIC BLOOD PRESSURE: 124 MMHG | HEIGHT: 62 IN | WEIGHT: 143.8 LBS | TEMPERATURE: 98.6 F | DIASTOLIC BLOOD PRESSURE: 76 MMHG | BODY MASS INDEX: 26.46 KG/M2

## 2025-05-27 DIAGNOSIS — M54.12 CERVICAL RADICULOPATHY: Primary | ICD-10-CM

## 2025-05-27 PROCEDURE — 99213 OFFICE O/P EST LOW 20 MIN: CPT | Performed by: FAMILY MEDICINE

## 2025-05-27 RX ORDER — AZELASTINE HYDROCHLORIDE 137 UG/1
SPRAY, METERED NASAL
COMMUNITY
Start: 2025-04-16

## 2025-05-27 SDOH — ECONOMIC STABILITY: FOOD INSECURITY: WITHIN THE PAST 12 MONTHS, THE FOOD YOU BOUGHT JUST DIDN'T LAST AND YOU DIDN'T HAVE MONEY TO GET MORE.: NEVER TRUE

## 2025-05-27 SDOH — ECONOMIC STABILITY: INCOME INSECURITY: IN THE LAST 12 MONTHS, WAS THERE A TIME WHEN YOU WERE NOT ABLE TO PAY THE MORTGAGE OR RENT ON TIME?: NO

## 2025-05-27 SDOH — ECONOMIC STABILITY: FOOD INSECURITY: WITHIN THE PAST 12 MONTHS, YOU WORRIED THAT YOUR FOOD WOULD RUN OUT BEFORE YOU GOT MONEY TO BUY MORE.: NEVER TRUE

## 2025-05-27 SDOH — ECONOMIC STABILITY: TRANSPORTATION INSECURITY
IN THE PAST 12 MONTHS, HAS THE LACK OF TRANSPORTATION KEPT YOU FROM MEDICAL APPOINTMENTS OR FROM GETTING MEDICATIONS?: NO

## 2025-05-27 NOTE — PROGRESS NOTES
Ragini Rush is a 54 y.o. female   CC:   Chief Complaint   Patient presents with   • Other     Neck/shoulder pain, PT has not helped-PT has nothing else that could be done, requesting an MRI       History of Present Illness  The patient presents for evaluation of back pain.    Back Pain  She reports persistent back pain that has not improved with physical therapy. The onset of her symptoms was sudden, occurring on 04/17/2025, characterized by an inability to move her neck. She describes the pain as radiating from her eyes to her neck and shoulder. Additionally, she has experienced numbness in her fingers. She has been referred back to her primary care physician by her physical therapist. An MRI has been scheduled for 06/03/2025. She has not yet purchased a cervical pillow and is considering chiropractic treatment. She has undergone 5 to 6 sessions of physical therapy and has been using a TENS unit for pain management. Additionally, she has been performing self-massage using tennis balls on the floor, which provides temporary relief. She also reports having massages that provide short-term relief.  - Onset: Sudden onset on 04/17/2025.  - Location: Radiating from her eyes to her neck and shoulder.  - Character: Persistent back pain, inability to move her neck, numbness in fingers.  - Alleviating/Aggravating Factors: Physical therapy, TENS unit, self-massage using tennis balls, massages provide short-term relief.  - Radiation: Pain radiates from eyes to neck and shoulder.  - Timing: Persistent, not improved with physical therapy.  - Severity: Persistent pain requiring multiple interventions.    Patient's past medical, surgical, social and/or family history reviewed, updated in chart, and are non-contributory (unless otherwise stated).  Medications and allergies also reviewed and updated in chart.      Results  Imaging   - X-ray of spine: Minimal multilevel degenerative changes with some spurring at the C5-6-7

## 2025-06-03 ENCOUNTER — HOSPITAL ENCOUNTER (OUTPATIENT)
Dept: MRI IMAGING | Age: 55
Discharge: HOME | End: 2025-06-05
Attending: FAMILY MEDICINE
Payer: COMMERCIAL

## 2025-06-03 DIAGNOSIS — M54.12 CERVICAL RADICULOPATHY: ICD-10-CM

## 2025-06-03 PROCEDURE — 72141 MRI NECK SPINE W/O DYE: CPT

## 2025-06-05 ENCOUNTER — OFFICE VISIT (OUTPATIENT)
Dept: FAMILY MEDICINE CLINIC | Age: 55
End: 2025-06-05
Payer: COMMERCIAL

## 2025-06-05 VITALS
BODY MASS INDEX: 25.95 KG/M2 | HEIGHT: 62 IN | RESPIRATION RATE: 18 BRPM | WEIGHT: 141 LBS | TEMPERATURE: 98.9 F | DIASTOLIC BLOOD PRESSURE: 60 MMHG | HEART RATE: 80 BPM | SYSTOLIC BLOOD PRESSURE: 116 MMHG | OXYGEN SATURATION: 97 %

## 2025-06-05 DIAGNOSIS — J32.9 SINOBRONCHITIS: Primary | ICD-10-CM

## 2025-06-05 DIAGNOSIS — J40 SINOBRONCHITIS: Primary | ICD-10-CM

## 2025-06-05 PROCEDURE — 99213 OFFICE O/P EST LOW 20 MIN: CPT

## 2025-06-05 RX ORDER — METHYLPREDNISOLONE 4 MG/1
TABLET ORAL
Qty: 1 KIT | Refills: 0 | Status: SHIPPED | OUTPATIENT
Start: 2025-06-05

## 2025-06-05 RX ORDER — BENZONATATE 100 MG/1
100 CAPSULE ORAL 3 TIMES DAILY PRN
Qty: 30 CAPSULE | Refills: 0 | Status: SHIPPED | OUTPATIENT
Start: 2025-06-05 | End: 2025-06-15

## 2025-06-05 RX ORDER — CEFDINIR 300 MG/1
300 CAPSULE ORAL EVERY 12 HOURS
Qty: 14 CAPSULE | Refills: 0 | Status: SHIPPED | OUTPATIENT
Start: 2025-06-05 | End: 2025-06-12

## 2025-06-05 RX ORDER — FEXOFENADINE HCL 180 MG/1
180 TABLET ORAL DAILY
COMMUNITY

## 2025-06-05 NOTE — PROGRESS NOTES
capsule; Take 1 capsule by mouth 3 times daily as needed for Cough    Disposition:  Disposition: Discharge to home    Script for Cefdinir, Medrol Dose pack, and Tessalon prescribed, side effects and adminstratin discussed. Pt expressed understanding. Follow-up with PCP if needed. Red flag symptoms were discussed with the patient including high or refractory fever, progressive SOB, dyspnea, CP, calf pain/swelling, shaking chills, vomiting, abdominal pain, lethargy, flank pain, or decreased urinary output.  If any of these occur, they should go immediately to the emergency department for further evaluation.  All questions were answered.  The patient relies understanding and was agreeable to the above plan.    AAYN Tidwell CNP    The patient (or guardian, if applicable) and other individuals in attendance with the patient were advised that Artificial Intelligence will be utilized during this visit to record, process the conversation to generate a clinical note, and support improvement of the AI technology. The patient (or guardian, if applicable) and other individuals in attendance at the appointment consented to the use of AI, including the recording.      Contains texts from [employer] (if applicable)

## 2025-06-09 ENCOUNTER — RESULTS FOLLOW-UP (OUTPATIENT)
Dept: FAMILY MEDICINE CLINIC | Age: 55
End: 2025-06-09

## 2025-06-12 NOTE — PROGRESS NOTES
6/12/2025      Shy De Leon  [address] [facility] [address]      Dear Colleague,    Thank you for referring your patient, Shy De Leon, to the [facility]. Please see a copy of my visit note below.    [facility] Dermatology Note  Encounter Date: Jun 12, 2025  Office Visit     Reviewed patient's past medical history and pertinent chart review prior to patient's visit today.     Dermatology Problem List:  # Acne  - spironolactone 50 mg -> 75 mg, BPO wash, clindamycin lotion    No personal history of skin cancer.  No family history of skin cancer.  ____________________________________________    Assessment & Plan:     # Acne  - Chronic, flared.   - Start clindamycin 1% lotion twice daily.   - Continue OTC BPO wash.   - Increase spironolactone to 50 mg daily. If tolerated after two weeks, increase to 75 mg daily. We reviewed potential side effects including breast tenderness, changes to the menstrual cycle, increased urination, and hypotension/ light headedness. The patient confirmed she is not pregnant or planing pregnancy.     # Multiple nevi, trunk and extremities  - No concerning features on dermoscopy. We discussed the importance of self exams at home. ABCDE criteria and importance of SPF 30+ sunscreen and photoprotective clothing reviewed.     # Cherry angiomas  - We discussed the benign nature of the skin lesions. No treatment required. Continued observation recommended. Follow up with any concerns.      Follow-up:  6 month acne evaluation, as needed for new or changing lesions or new concerns    All risks, benefits and alternatives were discussed with patient.  Patient is in agreement and understands the assessment and plan.  All questions were answered.  Mariposa Muñoz PA-C  [facility]    ____________________________________________    CC: Skin Check (FBSC/Concerns: Red spot on face. )    HPI:  Ms. Shy De Leon is a(n) 33 year old female who  Chief Complaint       Headache (x 3 days, Sudafed OTC), Congestion (rhinorrhea), and Sinus Problem (R ear fullness)      History of Present Illness   Source of history provided by: patient. Helga Khoury is a 54 y.o. old female presenting to the walk in clinic for evaluation of sinus pressure, nasal congestion, mild rhinorrhea, and right ear fullness for the past 3 days. Denies any fever, chills, loss of taste or smell, CP, dyspnea, LE edema, abdominal pain, vomiting, rash, or lethargy. Denies any hx of asthma, COPD, or tobacco use. Patient denies recent sick exposures. She has been taking Sudafed OTC with some symptomatic relief. Patient reports a history of multiple sinus infections in the past and states that the symptoms feel the same. ROS    Unless otherwise stated in this report or unable to obtain because of the patient's clinical or mental status as evidenced by the medical record, this patients's positive and negative responses for Review of Systems, constitutional, psych, eyes, ENT, cardiovascular, respiratory, gastrointestinal, neurological, genitourinary, musculoskeletal, integument systems and systems related to the presenting problem are either stated in the preceding or were not pertinent or were negative for the symptoms and/or complaints related to the medical problem. Past Medical History:  has no past medical history on file. Past Surgical History:  has no past surgical history on file. Social History:  reports that she has never smoked. She has never used smokeless tobacco.  Family History: family history is not on file. Allergies: Patient has no known allergies. Physical Exam         VS:  /70   Pulse 85   Temp 97.3 °F (36.3 °C)   Resp 16   Ht 5' 2\" (1.575 m)   Wt 151 lb (68.5 kg)   SpO2 97%   BMI 27.62 kg/m²    Oxygen Saturation Interpretation: Normal.    Constitutional:  Alert, development consistent with age. NAD. Head:  NC/NT. Airway patent.   Moderate TTP noted over the bilateral ethmoid and maxillary sinuses. TMs are translucent bilaterally without any erythema or perforation. Mouth: Posterior pharynx with mild erythema and clear postnasal drip. No tonsillar hypertrophy or exudate. Neck:  Normal ROM. Supple. No anterior cervical adenopathy noted. Lungs: CTAB without wheezes, rales, or rhonchi. CV:  Regular rate and rhythm, normal heart sounds, without pathological murmurs, ectopy, gallops, or rubs. Skin:  Normal turgor. Warm, dry, without visible rash. Lymphatic: No lymphangitis or adenopathy noted. Neurological:  Oriented. Motor functions intact. Lab / Imaging Results   (All laboratory and radiology results have been personally reviewed by myself)  Labs:  Results for orders placed or performed in visit on 02/04/22   POCT COVID-19, Antigen   Result Value Ref Range    SARS-COV-2, POC Not-Detected Not Detected    Lot Number pass     QC Pass/Fail pass     Performing Instrument BD Veritor        Imaging: All Radiology results interpreted by Radiologist unless otherwise noted. Assessment / Plan     Impression(s):  [facility] for headache, congestion and sinus problem. Diagnoses and all orders for this visit:    Acute bacterial sinusitis  -     azithromycin (ZITHROMAX Z-NANCY) 250 MG tablet; Take 2 tabs on day one, then 1 tab daily for the next 4 days  -     methylPREDNISolone (MEDROL DOSEPACK) 4 MG tablet; Take by mouth.  -     POCT COVID-19, Antigen      Disposition:  Disposition: Discharge to home. Rapid COVID-19 testing is negative in office. Confirmatory PCR swab deferred per patient request.  We will treat for suspected early sinusitis. Prescription written for Zithromax (per patient request) and a Medrol Dosepak, side effects discussed. Continue to increase fluids and rest.  Additional symptomatic relief discussed including Tylenol prn pain/fever.  Schedule f/u with PCP in 7-10 days if symptoms persist. ED sooner if presents today as a new patient for a full body skin cancer screening. The patient requests evaluation of a lesion on the left cheek. She notes a history of acne on the back and chest that has been ongoing for years. She currently takes spironolactone 25 mg and washes with a BPO wash. No other specific cutaneous concerns today. The patient reports trying to be diligent with photoprotection.      Physical Exam:  Vitals: There were no vitals taken for this visit.  SKIN: Total skin excluding the genitalia areas was performed. The exam included the scalp, face, neck, bilateral arms, chest, back, abdomen, bilateral legs, digits, mons pubis, buttocks, and nails.   - Ibrahim III.  - Scattered on the back and chest are inflammatory papules and pustules.   - Multiple tan/brown macules and papules scattered throughout exam, consistent with benign nevi. No concerning features on dermoscopy.     - Several 1-2 mm red dome shaped symmetric papules, consistent with cherry angiomas.     Medications:  Current Outpatient Medications   Medication Sig Dispense Refill     lamoTRIgine (LAMICTAL) 200 MG tablet Take 1 tablet (200 mg) by mouth daily. 30 tablet 2     QUEtiapine (SEROQUEL) 50 MG tablet Take 3 tablets (150 mg) by mouth at bedtime. 90 tablet 2     QUEtiapine (SEROQUEL) 50 MG tablet Take 1 tablet (50 mg) by mouth nightly as needed (sleep). 30 tablet 2     spironolactone (ALDACTONE) 25 MG tablet TAKE ONE TABLET BY MOUTH EVERY DAY 90 tablet 0     No current facility-administered medications for this visit.      Past Medical History:   Patient Active Problem List   Diagnosis     Medication side effects     Bipolar 1 disorder (H)     History reviewed. No pertinent past medical history.    CC Referred MD Alexis  No address on file on close of this encounter.    Again, thank you for allowing me to participate in the care of your patient.        Sincerely,        Mariposa Muñoz PA-C    Electronically signed symptoms worsen or change. ED immediately with high or refractory fever, progressive SOB, dyspnea, CP, calf pain/swelling, shaking chills, vomiting, abdominal pain, lethargy, flank pain, or decreased urinary output. Pt verbalizes understanding and is in agreement with plan of care. All questions answered. Eliel Bryan PA-C    **This report was transcribed using voice recognition software. Every effort was made to ensure accuracy; however, inadvertent computerized transcription errors may be present.

## 2025-08-20 ENCOUNTER — OFFICE VISIT (OUTPATIENT)
Dept: FAMILY MEDICINE CLINIC | Age: 55
End: 2025-08-20
Payer: COMMERCIAL

## 2025-08-20 VITALS
OXYGEN SATURATION: 97 % | BODY MASS INDEX: 27.05 KG/M2 | RESPIRATION RATE: 18 BRPM | SYSTOLIC BLOOD PRESSURE: 118 MMHG | TEMPERATURE: 97.4 F | DIASTOLIC BLOOD PRESSURE: 70 MMHG | HEART RATE: 92 BPM | HEIGHT: 62 IN | WEIGHT: 147 LBS

## 2025-08-20 DIAGNOSIS — J01.90 ACUTE SINUSITIS, RECURRENCE NOT SPECIFIED, UNSPECIFIED LOCATION: Primary | ICD-10-CM

## 2025-08-20 PROCEDURE — 99214 OFFICE O/P EST MOD 30 MIN: CPT | Performed by: PHYSICIAN ASSISTANT

## 2025-08-20 PROCEDURE — 96372 THER/PROPH/DIAG INJ SC/IM: CPT | Performed by: PHYSICIAN ASSISTANT

## 2025-08-20 RX ORDER — METHYLPREDNISOLONE 4 MG/1
TABLET ORAL
Qty: 1 KIT | Refills: 0 | Status: SHIPPED | OUTPATIENT
Start: 2025-08-20

## 2025-08-20 RX ORDER — METHYLPREDNISOLONE ACETATE 40 MG/ML
40 INJECTION, SUSPENSION INTRA-ARTICULAR; INTRALESIONAL; INTRAMUSCULAR; SOFT TISSUE ONCE
Status: COMPLETED | OUTPATIENT
Start: 2025-08-20 | End: 2025-08-20

## 2025-08-20 RX ADMIN — METHYLPREDNISOLONE ACETATE 40 MG: 40 INJECTION, SUSPENSION INTRA-ARTICULAR; INTRALESIONAL; INTRAMUSCULAR; SOFT TISSUE at 10:22

## 2025-08-20 ASSESSMENT — ENCOUNTER SYMPTOMS
COUGH: 1
DIARRHEA: 0
PHOTOPHOBIA: 0
NAUSEA: 0
VOICE CHANGE: 1
VOMITING: 0
SORE THROAT: 1
SHORTNESS OF BREATH: 0
BACK PAIN: 0
ABDOMINAL PAIN: 0

## 2025-09-02 ENCOUNTER — OFFICE VISIT (OUTPATIENT)
Dept: FAMILY MEDICINE CLINIC | Age: 55
End: 2025-09-02
Payer: COMMERCIAL

## 2025-09-02 VITALS
HEART RATE: 73 BPM | WEIGHT: 147 LBS | TEMPERATURE: 98.7 F | BODY MASS INDEX: 27.05 KG/M2 | SYSTOLIC BLOOD PRESSURE: 150 MMHG | RESPIRATION RATE: 18 BRPM | OXYGEN SATURATION: 98 % | DIASTOLIC BLOOD PRESSURE: 68 MMHG | HEIGHT: 62 IN

## 2025-09-02 DIAGNOSIS — J01.90 ACUTE SINUSITIS, RECURRENCE NOT SPECIFIED, UNSPECIFIED LOCATION: ICD-10-CM

## 2025-09-02 DIAGNOSIS — R39.9 UTI SYMPTOMS: Primary | ICD-10-CM

## 2025-09-02 DIAGNOSIS — B37.9 ANTIBIOTIC-INDUCED YEAST INFECTION: ICD-10-CM

## 2025-09-02 DIAGNOSIS — T36.95XA ANTIBIOTIC-INDUCED YEAST INFECTION: ICD-10-CM

## 2025-09-02 LAB
BILIRUBIN, POC: NEGATIVE
BLOOD URINE, POC: NORMAL
CLARITY, POC: CLEAR
COLOR, POC: CLEAR
CONTROL: NORMAL
GLUCOSE URINE, POC: NEGATIVE MG/DL
KETONES, POC: NEGATIVE MG/DL
LEUKOCYTE EST, POC: NEGATIVE
Lab: NORMAL
NITRITE, POC: NEGATIVE
PERFORMING INSTRUMENT: NORMAL
PH, POC: 6.5
PREGNANCY TEST URINE, POC: NEGATIVE
PROTEIN, POC: NEGATIVE MG/DL
QC PASS/FAIL: NORMAL
SARS-COV-2, POC: NORMAL
SPECIFIC GRAVITY, POC: 1.01
UROBILINOGEN, POC: 0.2 MG/DL

## 2025-09-02 PROCEDURE — 99214 OFFICE O/P EST MOD 30 MIN: CPT | Performed by: NURSE PRACTITIONER

## 2025-09-02 PROCEDURE — 81025 URINE PREGNANCY TEST: CPT | Performed by: NURSE PRACTITIONER

## 2025-09-02 PROCEDURE — 81002 URINALYSIS NONAUTO W/O SCOPE: CPT | Performed by: NURSE PRACTITIONER

## 2025-09-02 PROCEDURE — 87426 SARSCOV CORONAVIRUS AG IA: CPT | Performed by: NURSE PRACTITIONER

## 2025-09-02 RX ORDER — ESTRADIOL 0.1 MG/G
CREAM VAGINAL
COMMUNITY
Start: 2025-07-22

## 2025-09-02 RX ORDER — CEFDINIR 300 MG/1
300 CAPSULE ORAL 2 TIMES DAILY
Qty: 14 CAPSULE | Refills: 0 | Status: SHIPPED | OUTPATIENT
Start: 2025-09-02 | End: 2025-09-09

## 2025-09-02 RX ORDER — FLUCONAZOLE 150 MG/1
TABLET ORAL
Qty: 2 TABLET | Refills: 0 | Status: SHIPPED | OUTPATIENT
Start: 2025-09-02

## 2025-09-03 LAB
CULTURE: NO GROWTH
SPECIMEN DESCRIPTION: NORMAL

## (undated) DEVICE — GRADUATE TRIANG MEASURE 1000ML BLK PRNT

## (undated) DEVICE — SNARE ENDOSCP L240CM LOOP W13MM SHTH DIA2.4MM SM OVL FLX

## (undated) DEVICE — SPONGE GZ W4XL4IN RAYON POLY CVR W/NONWOVEN FAB STRL 2/PK

## (undated) DEVICE — GLOVE,SURG,SIGNATURE LTX MICR,LTX,PF,7.0: Brand: MEDLINE

## (undated) DEVICE — BLOCK BITE 60FR RUBBER ADLT DENTAL

## (undated) DEVICE — TRAP POLYP ETRAP

## (undated) DEVICE — FORCEPS BX OVL CUP FEN DISPOSABLE CAP L 160CM RAD JAW 4